# Patient Record
Sex: MALE | Race: WHITE | NOT HISPANIC OR LATINO | Employment: STUDENT | URBAN - METROPOLITAN AREA
[De-identification: names, ages, dates, MRNs, and addresses within clinical notes are randomized per-mention and may not be internally consistent; named-entity substitution may affect disease eponyms.]

---

## 2019-03-14 ENCOUNTER — OFFICE VISIT (OUTPATIENT)
Dept: INTERNAL MEDICINE CLINIC | Facility: CLINIC | Age: 19
End: 2019-03-14
Payer: COMMERCIAL

## 2019-03-14 VITALS
OXYGEN SATURATION: 95 % | WEIGHT: 151.2 LBS | HEART RATE: 130 BPM | HEIGHT: 67 IN | DIASTOLIC BLOOD PRESSURE: 100 MMHG | SYSTOLIC BLOOD PRESSURE: 134 MMHG | BODY MASS INDEX: 23.73 KG/M2 | TEMPERATURE: 98.8 F

## 2019-03-14 DIAGNOSIS — R56.9 SEIZURES (HCC): ICD-10-CM

## 2019-03-14 DIAGNOSIS — J20.9 ACUTE BRONCHITIS, UNSPECIFIED ORGANISM: Primary | ICD-10-CM

## 2019-03-14 PROCEDURE — 99204 OFFICE O/P NEW MOD 45 MIN: CPT | Performed by: INTERNAL MEDICINE

## 2019-03-14 PROCEDURE — 3008F BODY MASS INDEX DOCD: CPT | Performed by: INTERNAL MEDICINE

## 2019-03-14 PROCEDURE — 1036F TOBACCO NON-USER: CPT | Performed by: INTERNAL MEDICINE

## 2019-03-14 RX ORDER — BENZONATATE 100 MG/1
100 CAPSULE ORAL 3 TIMES DAILY PRN
Qty: 30 CAPSULE | Refills: 0 | Status: SHIPPED | OUTPATIENT
Start: 2019-03-14 | End: 2019-04-01 | Stop reason: SDUPTHER

## 2019-03-14 RX ORDER — LEVETIRACETAM 750 MG/1
TABLET, EXTENDED RELEASE ORAL
Refills: 8 | COMMUNITY
Start: 2019-03-14 | End: 2019-11-15 | Stop reason: SDUPTHER

## 2019-03-14 RX ORDER — AZITHROMYCIN 250 MG/1
TABLET, FILM COATED ORAL
Qty: 6 TABLET | Refills: 0 | Status: SHIPPED | OUTPATIENT
Start: 2019-03-14 | End: 2019-03-19

## 2019-03-14 RX ORDER — ALBUTEROL SULFATE 90 UG/1
2 AEROSOL, METERED RESPIRATORY (INHALATION) EVERY 6 HOURS PRN
Qty: 1 INHALER | Refills: 3 | Status: SHIPPED | OUTPATIENT
Start: 2019-03-14 | End: 2020-06-11 | Stop reason: SDUPTHER

## 2019-03-14 RX ORDER — ALBUTEROL SULFATE 2.5 MG/3ML
2.5 SOLUTION RESPIRATORY (INHALATION) EVERY 6 HOURS PRN
Qty: 60 VIAL | Refills: 3 | Status: SHIPPED | OUTPATIENT
Start: 2019-03-14

## 2019-03-14 NOTE — PROGRESS NOTES
INTERNAL MEDICINE OFFICE VISIT  [facility] AT [address]  Tel: [phone]      NAME: Heather Whittington  AGE: 22 y o  SEX: male  : 2000   MRN: [id number]    DATE: 3/14/2019  TIME: 5:54 PM      Assessment and Plan:  1  Acute bronchitis, unspecified organism  Patient has coughing and wheezing for the last 5 days and on examination was very tachycardic and his blood pressure was also a little elevated  He has a low-grade fever  I will get back to him with the results of the chest x-ray in order to rule out a possibility of pneumonia  He will take the antibiotic and the inhaler  He was told to go to the ER in case his condition worsens  - XR chest pa & lateral; Future  - azithromycin (ZITHROMAX) 250 mg tablet; Take 2 tablets today then 1 tablet daily x 4 days  Dispense: 6 tablet; Refill: 0  - benzonatate (TESSALON PERLES) 100 mg capsule; Take 1 capsule (100 mg total) by mouth 3 (three) times a day as needed for cough  Dispense: 30 capsule; Refill: 0  - albuterol (VENTOLIN HFA) 90 mcg/act inhaler; Inhale 2 puffs every 6 (six) hours as needed for wheezing  Dispense: 1 Inhaler; Refill: 3  - albuterol (2 5 mg/3 mL) 0 083 % nebulizer solution; Take 1 vial (2 5 mg total) by nebulization every 6 (six) hours as needed for wheezing or shortness of breath  Dispense: 60 vial; Refill: 3    2  Seizures ([address] )  He was recently diagnosed with seizures and started on Keppra  He has been seizure free since then  - Counseling Documentation: patient was counseled regarding: instructions for management, risk factor reductions, prognosis, patient and family education, impressions, risks and benefits of treatment options and importance of compliance with treatment  - Medication Side Effects: Adverse side effects of medications were reviewed with the patient/guardian today  Return for follow up visit in as needed or earlier, if needed        Chief Complaint:  Chief Complaint   Patient presents with    Cold Like Symptoms    Cough    Shortness of Breath         History of Present Illness: This is a new patient was here to establish  Bronchitis-he does not have a history of asthma in the past but for the last 5 days he has been coughing and wheezing a lot and is also short of breath  He has a very low-grade fever but does not complain of it  He has been taking over-the-counter decongestants  His heart rate and blood pressure were high which could be because of the over-the-counter medications or because of the sickness  Seizures-last year he was diagnosed with seizures when he had 2 episodes 1 after the other and was worked up for it and started on [address] by the neurologist, Randi Franco        Active Problem List:  Patient Active Problem List   Diagnosis    Seizures ([facility] )         Past Medical History:  Past Medical History:   Diagnosis Date    Seizures ([facility] )          Past Surgical History:  Past Surgical History:   Procedure Laterality Date    UNDESCENDED TESTICLE EXPLORATION           Family History:  Family History   Problem Relation Age of Onset   Mir Hypertension Mother     Asthma Father     Hypertension Maternal Grandfather     Heart disease Maternal Grandfather          Social History:  Social History     Socioeconomic History    Marital status: Single     Spouse name: None    Number of children: None    Years of education: None    Highest education level: None   Occupational History    None   Social Needs    Financial resource strain: None    Food insecurity:     Worry: None     Inability: None    Transportation needs:     Medical: None     Non-medical: None   Tobacco Use    Smoking status: Never Smoker    Smokeless tobacco: Never Used   Substance and Sexual Activity    Alcohol use: Never     Frequency: Never    Drug use: Never    Sexual activity: None   Lifestyle    Physical activity:     Days per week: None     Minutes per session: None    Stress: None   Relationships    Social connections:     Talks on phone: None     Gets together: None     Attends [employer] service: None     Active member of club or organization: None     Attends meetings of clubs or organizations: None     Relationship status: None    Intimate partner violence:     Fear of current or ex partner: None     Emotionally abused: None     Physically abused: None     Forced sexual activity: None   Other Topics Concern    None   Social History Narrative    None         Allergies:  No Known Allergies      Medications:    Current Outpatient Medications:     Levetiracetam 750 MG TB24, , Disp: , Rfl: 8    albuterol (2 5 mg/3 mL) 0 083 % nebulizer solution, Take 1 vial (2 5 mg total) by nebulization every 6 (six) hours as needed for wheezing or shortness of breath, Disp: 60 vial, Rfl: 3    albuterol (VENTOLIN HFA) 90 mcg/act inhaler, Inhale 2 puffs every 6 (six) hours as needed for wheezing, Disp: 1 Inhaler, Rfl: 3    azithromycin (ZITHROMAX) 250 mg tablet, Take 2 tablets today then 1 tablet daily x 4 days, Disp: 6 tablet, Rfl: 0    benzonatate (TESSALON PERLES) 100 mg capsule, Take 1 capsule (100 mg total) by mouth 3 (three) times a day as needed for cough, Disp: 30 capsule, Rfl: 0      The following portions of the patient's history were reviewed and updated as appropriate: past medical history, past surgical history, family history, social history, allergies, current medications and active problem list       Review of Systems:  Constitutional: Denies fever, chills, weight gain, weight loss, fatigue  Eyes: Denies eye redness, eye discharge, double vision, change in visual acuity  ENT: Denies hearing loss, tinnitus, sneezing, nasal congestion, nasal discharge, sore throat   Respiratory:  Complains of cough, expectoration,  shortness of breath, wheezing  Cardiovascular: Denies chest pain, palpitations, lower extremity swelling, orthopnea, PND  Gastrointestinal: Denies abdominal pain, heartburn, nausea, vomiting, hematemesis, diarrhea, bloody stools  Genito-Urinary: Denies dysuria, frequency, difficulty in micturition, nocturia, incontinence  Musculoskeletal: Denies back pain, joint pain, muscle pain  Neurologic: Denies confusion, lightheadedness, syncope, headache, focal weakness, sensory changes, seizures  Endocrine: Denies polyuria, polydipsia, temperature intolerance  Allergy and Immunology: Denies hives, insect bite sensitivity  Hematological and Lymphatic: Denies bleeding problems, swollen glands   Psychological: Denies depression, suicidal ideation, anxiety, panic, mood swings  Dermatological: Denies pruritus, rash, skin lesion changes      Vitals:  Vitals:    03/14/19 1404   BP: 134/100   Pulse: (!) 130   Temp: 98 8 °F (37 1 °C)   SpO2: 95%       Body mass index is 23 68 kg/m²  Weight (last 2 days)     Date/Time   Weight    03/14/19 1404   68 6 (151 2)                Physical Examination:  General: Patient is not in acute distress  Awake, alert, responding to commands  No weight gain or loss  Head: Normocephalic  Atraumatic  Eyes: Conjunctiva and lids with no swelling, erythema or discharge  Both pupils normal sized, round and reactive to light  Sclera nonicteric  ENT: External examination of nose and ear normal  Otoscopic examination shows translucent tympanic membranes with patent canals without erythema  Oropharynx moist with no erythema, edema, exudate or lesions  Neck: Supple  JVP not raised  Trachea midline  No masses  No thyromegaly  Lungs: No signs of increased work of breathing or respiratory distress  Bilateral  rhonchi  Chest wall: No tenderness  Cardiovascular: Normal PMI  No thrills  Tachycardic  S1 and S2 normal  No murmur, rub or gallop  Gastrointestinal: Abdomen soft, nontender  No guarding or rigidity  Liver and spleen not palpable  Bowel sounds present  Neurologic: Cranial nerves II-XII intact   Cortical functions normal  Motor system - Reflexes 2+ and symmetrical  Sensations normal  Musculoskeletal: Gait normal  No joint tenderness  Integumentary: Skin normal with no rash or lesions  Lymphatic: No palpable lymph nodes in neck, axilla or groin  Extremities: No clubbing, cyanosis, edema or varicosities  Psychological: Judgement and insight normal  Mood and affect normal      Laboratory Results:  CBC with diff:   No results found for: WBC, RBC, HGB, HCT, MCV, MCH, RDW, PLT    CMP:  No results found for: CREATININE, BUN, NA, K, CL, CO2, GLUCOSE, PROT, ALKPHOS, ALT, AST, BILIDIR    No results found for: HGBA1C, MG, PHOS    No results found for: TROPONINI, CKMB, CKTOTAL    Lipid Profile:   No results found for: CHOL  No results found for: HDL  No results found for: LDLCALC  No results found for: TRIG    Imaging Results:  No image results found         Health Maintenance:  Health Maintenance   Topic Date Due    INFLUENZA VACCINE  07/01/2018    Depression Screening PHQ  03/14/2020    BMI: Adult  03/14/2020    DTaP,Tdap,and Td Vaccines (7 - Td) 01/17/2021    HEPATITIS B VACCINES  Completed     Immunization History   Administered Date(s) Administered    DTaP 1999, 09/21/2000, 09/15/2004    DTaP / HiB 2000, 2000    HPV Quadrivalent 07/10/2014    HPV9 08/09/2016    Hep B, Adolescent or Pediatric 2000, 2000, 04/04/2000    Hepatitis A 01/17/2011, 07/10/2014    HiB 2000, 09/21/2000    INFLUENZA 09/15/2004, 10/13/2004, 11/16/2005, 10/17/2006, 01/17/2011, 10/03/2017    IPV 2000, 2000, 12/13/2000, 09/15/2004    MMR 06/21/2000, 09/15/2004    Meningococcal B, Recombinant 08/09/2016    Meningococcal MCV4P 01/17/2011, 08/09/2016    Tdap 01/17/2011    Varicella 06/21/2000, 07/21/2009         Ed MD Enrique  0/77/9224,7:33 PM

## 2019-03-16 ENCOUNTER — TELEPHONE (OUTPATIENT)
Dept: INTERNAL MEDICINE CLINIC | Facility: CLINIC | Age: 19
End: 2019-03-16

## 2019-03-16 NOTE — TELEPHONE ENCOUNTER
----- Message from Clemencia Roland MD sent at 3/15/2019  6:36 PM EDT -----  XR chest does not show pneumonia, please continue present treatment as advised

## 2019-04-01 ENCOUNTER — OFFICE VISIT (OUTPATIENT)
Dept: INTERNAL MEDICINE CLINIC | Facility: CLINIC | Age: 19
End: 2019-04-01
Payer: COMMERCIAL

## 2019-04-01 VITALS
WEIGHT: 150 LBS | HEART RATE: 120 BPM | BODY MASS INDEX: 23.54 KG/M2 | DIASTOLIC BLOOD PRESSURE: 84 MMHG | SYSTOLIC BLOOD PRESSURE: 126 MMHG | HEIGHT: 67 IN | OXYGEN SATURATION: 94 %

## 2019-04-01 DIAGNOSIS — J45.909 ASTHMATIC BRONCHITIS WITHOUT COMPLICATION, UNSPECIFIED ASTHMA SEVERITY, UNSPECIFIED WHETHER PERSISTENT: Primary | ICD-10-CM

## 2019-04-01 DIAGNOSIS — J20.9 ACUTE BRONCHITIS, UNSPECIFIED ORGANISM: ICD-10-CM

## 2019-04-01 PROCEDURE — 1036F TOBACCO NON-USER: CPT | Performed by: PHYSICIAN ASSISTANT

## 2019-04-01 PROCEDURE — 99214 OFFICE O/P EST MOD 30 MIN: CPT | Performed by: PHYSICIAN ASSISTANT

## 2019-04-01 PROCEDURE — 3008F BODY MASS INDEX DOCD: CPT | Performed by: PHYSICIAN ASSISTANT

## 2019-04-01 RX ORDER — BENZONATATE 100 MG/1
100 CAPSULE ORAL 3 TIMES DAILY PRN
Qty: 30 CAPSULE | Refills: 0 | Status: SHIPPED | OUTPATIENT
Start: 2019-04-01 | End: 2019-06-03

## 2019-04-01 RX ORDER — PREDNISONE 10 MG/1
10 TABLET ORAL DAILY
Qty: 20 TABLET | Refills: 0 | Status: SHIPPED | OUTPATIENT
Start: 2019-04-01 | End: 2019-06-03

## 2019-06-03 ENCOUNTER — OFFICE VISIT (OUTPATIENT)
Dept: INTERNAL MEDICINE CLINIC | Facility: CLINIC | Age: 19
End: 2019-06-03
Payer: COMMERCIAL

## 2019-06-03 VITALS
DIASTOLIC BLOOD PRESSURE: 84 MMHG | BODY MASS INDEX: 24.2 KG/M2 | WEIGHT: 154.2 LBS | HEART RATE: 114 BPM | HEIGHT: 67 IN | SYSTOLIC BLOOD PRESSURE: 124 MMHG | OXYGEN SATURATION: 96 %

## 2019-06-03 DIAGNOSIS — J45.40 ASTHMA IN ADULT, MODERATE PERSISTENT, UNCOMPLICATED: Primary | ICD-10-CM

## 2019-06-03 PROCEDURE — 3008F BODY MASS INDEX DOCD: CPT | Performed by: INTERNAL MEDICINE

## 2019-06-03 PROCEDURE — 1036F TOBACCO NON-USER: CPT | Performed by: INTERNAL MEDICINE

## 2019-06-03 PROCEDURE — 99213 OFFICE O/P EST LOW 20 MIN: CPT | Performed by: INTERNAL MEDICINE

## 2019-06-03 RX ORDER — PREDNISONE 10 MG/1
TABLET ORAL
Qty: 30 TABLET | Refills: 0 | Status: SHIPPED | OUTPATIENT
Start: 2019-06-03 | End: 2020-10-22

## 2019-06-03 RX ORDER — AZITHROMYCIN 250 MG/1
TABLET, FILM COATED ORAL
Qty: 6 TABLET | Refills: 0 | Status: SHIPPED | OUTPATIENT
Start: 2019-06-03 | End: 2019-06-08

## 2019-11-15 ENCOUNTER — TELEPHONE (OUTPATIENT)
Dept: INTERNAL MEDICINE CLINIC | Facility: CLINIC | Age: 19
End: 2019-11-15

## 2019-11-15 DIAGNOSIS — R56.9 SEIZURES (HCC): Primary | ICD-10-CM

## 2019-11-15 RX ORDER — LEVETIRACETAM 750 MG/1
750 TABLET, EXTENDED RELEASE ORAL DAILY
Qty: 90 TABLET | Refills: 1 | Status: SHIPPED | OUTPATIENT
Start: 2019-11-15 | End: 2019-11-18 | Stop reason: SDUPTHER

## 2019-11-15 NOTE — TELEPHONE ENCOUNTER
Dr Demetrio Reed is no longer in the area  Mother would like to talk to some one in regards to RX and refer to a new neurologist   RX needed  Levetiracetam 750 mg extended release      Please call pt mother Maldonado Monteiro [phone] can leave a message or call after 1:00pm

## 2019-11-18 ENCOUNTER — TELEPHONE (OUTPATIENT)
Dept: INTERNAL MEDICINE CLINIC | Facility: CLINIC | Age: 19
End: 2019-11-18

## 2019-11-18 DIAGNOSIS — R56.9 SEIZURES (HCC): ICD-10-CM

## 2019-11-18 RX ORDER — LEVETIRACETAM 750 MG/1
750 TABLET, EXTENDED RELEASE ORAL 3 TIMES DAILY
Qty: 90 TABLET | Refills: 1 | Status: SHIPPED | OUTPATIENT
Start: 2019-11-18 | End: 2019-11-20 | Stop reason: SDUPTHER

## 2019-11-18 NOTE — TELEPHONE ENCOUNTER
Florencio Erwin from [facility] called stating that Ranjana Solis called stating that Beth Silva needs   keppra (generic) 90 pills for 30 days not 90 pills for 90 days       Please send new script  To [facility]     Any questions please contact Duy Benton   [phone]

## 2019-11-20 DIAGNOSIS — R56.9 SEIZURES (HCC): ICD-10-CM

## 2019-11-20 RX ORDER — LEVETIRACETAM 750 MG/1
750 TABLET, EXTENDED RELEASE ORAL 3 TIMES DAILY
Qty: 90 TABLET | Refills: 1 | Status: SHIPPED | OUTPATIENT
Start: 2019-11-20 | End: 2020-04-21 | Stop reason: SDUPTHER

## 2019-11-20 NOTE — TELEPHONE ENCOUNTER
Jamin Nunez from [facility] called, pt's mother would like levetiracetam 750mg sent to [facility] on [address]  Medication was [facility] sent to [facility] but is cheaper at [facility]

## 2019-12-30 ENCOUNTER — TELEPHONE (OUTPATIENT)
Dept: NEUROLOGY | Facility: CLINIC | Age: 19
End: 2019-12-30

## 2019-12-30 NOTE — TELEPHONE ENCOUNTER
Best contact number for THANIA:[phone]    Emergency Contact name and number:    Referring provider: Dr Angle Burrell     Referring provider Telephone:________________    Primary Care Provider Name:     Reason for Appointment/Dx: Irina Servin     Neurology Location patient would like to be seen:    Order received? Yes                                                 Records Received? Yes     Have you ever seen another Neurologist? No    Insurance Information    Insurance Name: [employer] 2     ID/Policy #:    Secondary Insurance:    ID/Policy#: Workman's Comp/ Accident/ School  Information      Workman's Comp/Accident/School related?  No    If yes name of Insurance company:    Date of Injury:    Open Claim:no    [address] Name and Telephone Number:    Notes:                   Appointment date:04/07/2020 _____________________________

## 2020-03-04 ENCOUNTER — TELEPHONE (OUTPATIENT)
Dept: NEUROLOGY | Facility: CLINIC | Age: 20
End: 2020-03-04

## 2020-03-04 ENCOUNTER — DOCUMENTATION (OUTPATIENT)
Dept: NEUROLOGY | Facility: CLINIC | Age: 20
End: 2020-03-04

## 2020-03-04 ENCOUNTER — CONSULT (OUTPATIENT)
Dept: NEUROLOGY | Facility: CLINIC | Age: 20
End: 2020-03-04
Payer: COMMERCIAL

## 2020-03-04 VITALS
HEIGHT: 67 IN | WEIGHT: 170 LBS | SYSTOLIC BLOOD PRESSURE: 136 MMHG | DIASTOLIC BLOOD PRESSURE: 80 MMHG | BODY MASS INDEX: 26.68 KG/M2 | HEART RATE: 90 BPM

## 2020-03-04 DIAGNOSIS — R56.9 SEIZURES (HCC): ICD-10-CM

## 2020-03-04 PROCEDURE — 99244 OFF/OP CNSLTJ NEW/EST MOD 40: CPT | Performed by: PSYCHIATRY & NEUROLOGY

## 2020-03-04 NOTE — PROGRESS NOTES
Called and spoke with Jasmine from [facility] ([phone]) requesting Medical records again, let her know his mother requested it be sent a while ago  Gave her our emergency fax number [phone]

## 2020-03-04 NOTE — PROGRESS NOTES
Fabiola Anna is a 20 y o  male  Chief Complaint   Patient presents with    Seizures     Only had 3, 1st Nov 2017, 2nd Jan 2018, 3rd this morning        Assessment:  1  Seizures ([address] )          Discussion:  Patient's prior records from his neurologist Linnette Vega were reviewed, his MRI scan of the brain report from 10/18/2017 showed chronic pansinusitis otherwise normal MRI of the brain, patient was supposed to take Keppra 750 mg 2 tablets twice a day but according to the patient and the mother he was taking only 750 mg 2 tablets at nighttime, and I think that could have been 1 of the reasons for his having seizures, I have advised the patient to take 750 mg 2 tablets equal  to 1500 mg in the morning and 1500 mg in the evening and check routine blood work and a Keppra level, he was advised not to drive, he was also advised to take seizure precautions which we discussed in detail including not to climb heights, not to be left alone, not to swim alone, not to operate any machinery, avoid activities that could be obtained due to himself or to others, he was advised to avoid seizure triggers including avoiding sleep deprivation, photic lights and alcohol, an initial reporting DMV form was sent with patient and the familie's knowledge, if needed in the future we can always have him see 1 of our epilepsy specialist for seizure localization and classification,, he will follow-up with the family physician or with his dentist regarding his tongue biting I did not see any evidence of tongue bite but his mouth does feel sore, he was advised to avoid sleep deprivation and sleep at least for 7-8 hours a night, to go to the hospital if has any recurrence of the seizure and call us and to see me back in 2 months    Subjective:    HPI   Patient is here for evaluation of his seizures, he is accompanied with his mother, his 1st seizure was in 2017 September, the mother heard a scream in the morning and saw the patient shaking and stiff lasting less than a minute, he was forming at the mouth, there was no tongue biting, no incontinence, he was lethargic for 20 minutes was taken to p m  see had blood work and a CT head and was discharged the same day, a he saw a neurologist and was started on Keppra, initially according to the mom he was given 750 mg extended release 2 tablets, he then subsequently had a seizure in January of 2018 and was told to take Keppra extended release 750 mg 3 pills, and then for some reason their insurance company was not letting them get the extended release and he was changed to regular Keppra 750 mg 2 tablets twice a day a few months back but according to the mother they did not understand and he was taking Keppra 750 mg 2 tablets at nighttime, and this morning the mother heard a yell and she saw him having a seizure for about a minute with his eyes open, he bit his tongue and he was postictal for 10-15 minutes, the seizure lasted for about a minute, there was no bowel and bladder incontinence, he did not have any injuries other than the tongue biting , he lives at home, there is no family history of seizure, he had the normal childhood in no history of head trauma, no recent illnesses, he did not take any over-the-counter medications other than his asthma inhaler that he uses p r n  [facility], he usually goes to sleep by about 1:00 a m  and gets up around 8:00 a m  if he is going to school otherwise he gets up at 10 a m  no other complaints      Vitals:    03/04/20 1407   BP: 136/80   BP Location: Left arm   Patient Position: Sitting   Cuff Size: Adult   Pulse: 90   Weight: 77 1 kg (170 lb)   Height: 5' 7" (1 702 m)       Current Medications    Current Outpatient Medications:     albuterol (2 5 mg/3 mL) 0 083 % nebulizer solution, Take 1 vial (2 5 mg total) by nebulization every 6 (six) hours as needed for wheezing or shortness of breath, Disp: 60 vial, Rfl: 3    albuterol (VENTOLIN HFA) 90 mcg/act inhaler, Inhale 2 puffs every 6 (six) hours as needed for wheezing, Disp: 1 Inhaler, Rfl: 3    fluticasone-salmeterol (ADVAIR DISKUS, WIXELA INHUB) 250-50 mcg/dose inhaler, Inhale 1 puff 2 (two) times a day Rinse mouth after use , Disp: 3 Inhaler, Rfl: 3    Levetiracetam 750 MG TB24, Take 1 tablet (750 mg total) by mouth 3 (three) times a day, Disp: 90 tablet, Rfl: 1    predniSONE 10 mg tablet, Take 4 tabs daily for 3 days followed by 3 tabs daily for 3 days then 2 tabs daily for 3 days then 1 tab daily for 3 days (Patient not taking: Reported on 3/4/2020), Disp: 30 tablet, Rfl: 0      Allergies  Patient has no known allergies  Past Medical History  Past Medical History:   Diagnosis Date    Seizures [facility])          Past Surgical History:  Past Surgical History:   Procedure Laterality Date    UNDESCENDED TESTICLE EXPLORATION           Family History:  Family History   Problem Relation Age of Onset    Hypertension Mother     Asthma Father     Hypertension Maternal Grandfather     Heart disease Maternal Grandfather        Social History:   reports that he has never smoked  He has never used smokeless tobacco  He reports that he does not drink alcohol or use drugs  I have reviewed the past medical history, surgical history, social and family history, current medications, allergies vitals, review of systems, and updated this information as appropriate today  Objective:    Physical Exam    Neurological Exam    GENERAL:  Cooperative in no acute distress  Well-developed and well-nourished    HEAD and NECK   Head is atraumatic normocephalic with no lesions or masses  Neck is supple with full range of motion    CARDIOVASCULAR  Carotid Arteries-no carotid bruits  NEUROLOGIC:  Mental Status-the patient is awake alert and oriented without aphasia or apraxia  Cranial Nerves: Visual fields are full to confrontation  Discs are flat limited exam as unable to dilate the pupils    Visual acuity is 20/20 with hand-held chart Extraocular movements are full without nystagmus  Pupils are 2-1/2 mm and reactive  Face is symmetrical to light touch  Movements of facial expression move symmetrically  Hearing is normal to finger rub bilaterally  Soft palate lifts symmetrically  Shoulder shrug is symmetrical  Tongue is midline without atrophy  Motor: No drift is noted on arm extension  Strength is full in the upper and lower extremities with normal bulk and tone  Sensory: Intact to temperature and vibratory sensation in the upper and lower extremities bilaterally  Cortical function is intact  Coordination: Finger to nose testing is performed accurately  Romberg is negative  Gait reveals a normal base with symmetrical arm swing  Tandem walk is normal   Reflexes:      2+ and symmetrical Toes are downgoing  No spine tenderness, no visible injuries,          ROS:  Review of Systems   Constitutional: Negative  HENT: Negative  Eyes: Negative  Respiratory: Negative  Cardiovascular: Negative  Gastrointestinal: Negative  Endocrine: Negative  Genitourinary: Negative  Musculoskeletal: Negative  Skin: Negative  Allergic/Immunologic: Negative  Neurological: Positive for seizures  Hematological: Negative  Psychiatric/Behavioral: Negative  All other systems reviewed and are negative

## 2020-03-04 NOTE — TELEPHONE ENCOUNTER
3/4 LM FOR PT ?? IF [employer] REF NEEDED   UNABLE TO VIEW INSURANCE CARD ON [facility]     REQUESTED CALL BACK

## 2020-03-05 ENCOUNTER — TELEPHONE (OUTPATIENT)
Dept: NEUROLOGY | Facility: CLINIC | Age: 20
End: 2020-03-05

## 2020-03-10 ENCOUNTER — TELEPHONE (OUTPATIENT)
Dept: NEUROLOGY | Facility: CLINIC | Age: 20
End: 2020-03-10

## 2020-03-10 NOTE — TELEPHONE ENCOUNTER
kylie called and states that pt is there to have labs drawn  pt has a LEVE level  she states that pt took his meds about 1 hour ago  she is asking if we want labs drawn or should pt wait  i advised that pt have lab work done prior to taking medication    she will info pt

## 2020-03-16 LAB
ALBUMIN SERPL-MCNC: 4.6 G/DL (ref 3.6–5.1)
ALBUMIN/GLOB SERPL: 1.6 (CALC) (ref 1–2.5)
ALP SERPL-CCNC: 78 U/L (ref 36–130)
ALT SERPL-CCNC: 28 U/L (ref 9–46)
AST SERPL-CCNC: 21 U/L (ref 10–40)
BASOPHILS # BLD AUTO: 49 CELLS/UL (ref 0–200)
BASOPHILS NFR BLD AUTO: 0.9 %
BILIRUB SERPL-MCNC: 1.8 MG/DL (ref 0.2–1.2)
BUN SERPL-MCNC: 12 MG/DL (ref 7–25)
BUN/CREAT SERPL: ABNORMAL (CALC) (ref 6–22)
CALCIUM SERPL-MCNC: 9.8 MG/DL (ref 8.6–10.3)
CHLORIDE SERPL-SCNC: 102 MMOL/L (ref 98–110)
CO2 SERPL-SCNC: 28 MMOL/L (ref 20–32)
CREAT SERPL-MCNC: 0.94 MG/DL (ref 0.6–1.35)
EOSINOPHIL # BLD AUTO: 545 CELLS/UL (ref 15–500)
EOSINOPHIL NFR BLD AUTO: 10.1 %
ERYTHROCYTE [DISTWIDTH] IN BLOOD BY AUTOMATED COUNT: 12.1 % (ref 11–15)
GLOBULIN SER CALC-MCNC: 2.8 G/DL (CALC) (ref 1.9–3.7)
GLUCOSE SERPL-MCNC: 81 MG/DL (ref 65–99)
HCT VFR BLD AUTO: 44.1 % (ref 38.5–50)
HGB BLD-MCNC: 15.2 G/DL (ref 13.2–17.1)
LEVETIRACETAM SERPL-MCNC: 25 MCG/ML (ref 12–46)
LYMPHOCYTES # BLD AUTO: 1534 CELLS/UL (ref 850–3900)
LYMPHOCYTES NFR BLD AUTO: 28.4 %
MCH RBC QN AUTO: 30.2 PG (ref 27–33)
MCHC RBC AUTO-ENTMCNC: 34.5 G/DL (ref 32–36)
MCV RBC AUTO: 87.7 FL (ref 80–100)
MONOCYTES # BLD AUTO: 497 CELLS/UL (ref 200–950)
MONOCYTES NFR BLD AUTO: 9.2 %
NEUTROPHILS # BLD AUTO: 2776 CELLS/UL (ref 1500–7800)
NEUTROPHILS NFR BLD AUTO: 51.4 %
PLATELET # BLD AUTO: 250 THOUSAND/UL (ref 140–400)
PMV BLD REES-ECKER: 11.1 FL (ref 7.5–12.5)
POTASSIUM SERPL-SCNC: 4.2 MMOL/L (ref 3.5–5.3)
PROT SERPL-MCNC: 7.4 G/DL (ref 6.1–8.1)
RBC # BLD AUTO: 5.03 MILLION/UL (ref 4.2–5.8)
SL AMB EGFR AFRICAN AMERICAN: 135 ML/MIN/1.73M2
SL AMB EGFR NON AFRICAN AMERICAN: 116 ML/MIN/1.73M2
SODIUM SERPL-SCNC: 139 MMOL/L (ref 135–146)
WBC # BLD AUTO: 5.4 THOUSAND/UL (ref 3.8–10.8)

## 2020-03-19 ENCOUNTER — HOSPITAL ENCOUNTER (OUTPATIENT)
Dept: NEUROLOGY | Facility: HOSPITAL | Age: 20
Discharge: HOME | End: 2020-03-19
Attending: PSYCHIATRY & NEUROLOGY
Payer: COMMERCIAL

## 2020-03-19 DIAGNOSIS — R56.9 SEIZURES (HCC): ICD-10-CM

## 2020-03-19 PROCEDURE — 95816 EEG AWAKE AND DROWSY: CPT

## 2020-03-19 PROCEDURE — 95816 EEG AWAKE AND DROWSY: CPT | Performed by: PSYCHIATRY & NEUROLOGY

## 2020-04-21 DIAGNOSIS — R56.9 SEIZURES (HCC): ICD-10-CM

## 2020-04-21 RX ORDER — LEVETIRACETAM 750 MG/1
1500 TABLET, EXTENDED RELEASE ORAL 2 TIMES DAILY
Qty: 360 TABLET | Refills: 1 | Status: SHIPPED | OUTPATIENT
Start: 2020-04-21 | End: 2021-02-08 | Stop reason: CLARIF

## 2020-04-23 RX ORDER — LEVETIRACETAM 750 MG/1
TABLET ORAL
Qty: 360 TABLET | Refills: 1 | Status: SHIPPED | OUTPATIENT
Start: 2020-04-23 | End: 2021-02-09

## 2020-06-11 DIAGNOSIS — J20.9 ACUTE BRONCHITIS, UNSPECIFIED ORGANISM: ICD-10-CM

## 2020-06-11 RX ORDER — ALBUTEROL SULFATE 90 UG/1
2 AEROSOL, METERED RESPIRATORY (INHALATION) EVERY 6 HOURS PRN
Qty: 1 INHALER | Refills: 3 | Status: SHIPPED | OUTPATIENT
Start: 2020-06-11

## 2020-06-22 ENCOUNTER — TELEPHONE (OUTPATIENT)
Dept: INTERNAL MEDICINE CLINIC | Facility: CLINIC | Age: 20
End: 2020-06-22

## 2020-06-22 NOTE — TELEPHONE ENCOUNTER
Patient is requesting a refill for the    fluticasone-salmeterol (ADVAIR DISKUS, WIXELA INHUB) 250-50 mcg/dose inhaler     Send to [facility]

## 2020-07-30 ENCOUNTER — OFFICE VISIT (OUTPATIENT)
Dept: NEUROLOGY | Facility: CLINIC | Age: 20
End: 2020-07-30
Payer: COMMERCIAL

## 2020-07-30 ENCOUNTER — TELEPHONE (OUTPATIENT)
Dept: NEUROLOGY | Facility: CLINIC | Age: 20
End: 2020-07-30

## 2020-07-30 VITALS
DIASTOLIC BLOOD PRESSURE: 82 MMHG | SYSTOLIC BLOOD PRESSURE: 134 MMHG | TEMPERATURE: 99 F | BODY MASS INDEX: 24.86 KG/M2 | WEIGHT: 158.4 LBS | RESPIRATION RATE: 14 BRPM | HEART RATE: 89 BPM | HEIGHT: 67 IN | OXYGEN SATURATION: 96 %

## 2020-07-30 DIAGNOSIS — R56.9 SEIZURES (HCC): Primary | ICD-10-CM

## 2020-07-30 PROCEDURE — 99214 OFFICE O/P EST MOD 30 MIN: CPT | Performed by: PSYCHIATRY & NEUROLOGY

## 2020-07-30 PROCEDURE — 1036F TOBACCO NON-USER: CPT | Performed by: PSYCHIATRY & NEUROLOGY

## 2020-07-30 PROCEDURE — 3008F BODY MASS INDEX DOCD: CPT | Performed by: PSYCHIATRY & NEUROLOGY

## 2020-07-30 NOTE — PROGRESS NOTES
Pastora Colin is a 23 y o  male  Chief Complaint   Patient presents with    Follow-up     Last SZ: 3/4/20       Assessment:  1  Seizures ([address] )          Discussion:  Patient's routine EEG was normal, Keppra level was 25, CBC was unremarkable CMP showed a slightly elevated total bilirubin for which patient was advised to follow up with family physician,  patient and the family to call me after the test to discuss the results, he is doing good on Keppra 1500 mg twice a day, he was advised to continue with same  I have advised him to avoid seizure triggers and to take seizure precautions, to be under constant supervision, he could see 1 of her seizure specialist in the future if needed, currently they would like to hold off, he was advised to avoid sleep deprivation, to sleep at least 7-8 hours a night, to go to the hospital if has any recurrence of the seizure and call us otherwise to see me back in 3 months and follow up with the other physicians      Subjective:    HPI   Patient is here in follow-up for his seizures, he is accompanied with his mother, his 1st seizure was in 2017 September, mother heard a scream in the morning and saw the patient shaking and stiff lasting for less than a minute, he was foaming at the mouth, there was no tongue biting no incontinence he was lethargic for 20 minutes was taken to the hospital and had a blood work and CT of the head and was discharged the same day, he was started on Keppra 750 mg extended release 2 tablets a day, he subsequently had a seizure in January of 2018 and was told to take Keppra extended release 750 mg 3 pills and for some reason his insurance company did not approve the medication and it was changed to regular Keppra 750 mg 2 tablets twice a day, they did not understand the instructions and he was taking 750 mg 2 tablets once a day and then had a 3rd seizure in March at which time he saw me and we increase the medication to 750 mg 2 tablets twice a day, since then patient has not had any seizures, no side effects to the medication, mood is good, no suicidal or homicidal thoughts, no other complaints          Vitals:    07/30/20 1416   BP: 134/82   BP Location: Left arm   Patient Position: Sitting   Cuff Size: Standard   Pulse: 89   Resp: 14   Temp: 99 °F (37 2 °C)   SpO2: 96%   Weight: 71 8 kg (158 lb 6 4 oz)   Height: 5' 7" (1 702 m)       Current Medications    Current Outpatient Medications:     albuterol (2 5 mg/3 mL) 0 083 % nebulizer solution, Take 1 vial (2 5 mg total) by nebulization every 6 (six) hours as needed for wheezing or shortness of breath, Disp: 60 vial, Rfl: 3    albuterol (Ventolin HFA) 90 mcg/act inhaler, Inhale 2 puffs every 6 (six) hours as needed for wheezing, Disp: 1 Inhaler, Rfl: 3    fluticasone-salmeterol (ADVAIR DISKUS, WIXELA INHUB) 250-50 mcg/dose inhaler, Inhale 1 puff 2 (two) times a day Rinse mouth after use , Disp: 3 Inhaler, Rfl: 3    levETIRAcetam (KEPPRA) 750 mg tablet, Take 2 tablets (1,500 mg total) by mouth 2 (two) times a day, Disp: 360 tablet, Rfl: 1    Levetiracetam 750 MG TB24, Take 2 tablets (1,500 mg total) by mouth 2 (two) times a day, Disp: 360 tablet, Rfl: 1    predniSONE 10 mg tablet, Take 4 tabs daily for 3 days followed by 3 tabs daily for 3 days then 2 tabs daily for 3 days then 1 tab daily for 3 days (Patient not taking: Reported on 3/4/2020), Disp: 30 tablet, Rfl: 0      Allergies  Patient has no known allergies  Past Medical History  Past Medical History:   Diagnosis Date    Seizures [facility])          Past Surgical History:  Past Surgical History:   Procedure Laterality Date    UNDESCENDED TESTICLE EXPLORATION           Family History:  Family History   Problem Relation Age of Onset    Hypertension Mother     Asthma Father     Hypertension Maternal Grandfather     Heart disease Maternal Grandfather        Social History:   reports that he has never smoked   He has never used smokeless tobacco  He reports that he does not drink alcohol or use drugs  I have reviewed the past medical history, surgical history, social and family history, current medications, allergies vitals, review of systems, and updated this information as appropriate today  Objective:    Physical Exam    Neurological Exam    GENERAL:  Cooperative in no acute distress  Well-developed and well-nourished    HEAD and NECK   Head is atraumatic normocephalic with no lesions or masses  Neck is supple with full range of motion      NEUROLOGIC:  Mental Status-the patient is awake alert and oriented without aphasia or apraxia  Cranial Nerves: Visual fields are full to confrontation  Extraocular movements are full without nystagmus  Pupils are 2-1/2 mm and reactive  Face is symmetrical to light touch  Movements of facial expression move symmetrically  Hearing is normal to finger rub bilaterally  Soft palate lifts symmetrically  Shoulder shrug is symmetrical  Tongue is midline without atrophy  Patient did remove the mask for the exam  Motor: No drift is noted on arm extension  Strength is full in the upper and lower extremities with normal bulk and tone  Coordination: Finger to nose testing is performed accurately  Romberg is negative  Gait reveals a normal base with symmetrical arm swing  Reflexes:  2+ and symmetrical          ROS:  Review of Systems   Constitutional: Negative  Negative for appetite change and fever  HENT: Negative  Negative for hearing loss, tinnitus, trouble swallowing and voice change  Eyes: Negative  Negative for photophobia and pain  Respiratory: Negative  Negative for shortness of breath  Cardiovascular: Negative  Negative for palpitations  Gastrointestinal: Negative  Negative for nausea and vomiting  Endocrine: Negative  Negative for cold intolerance  Genitourinary: Negative  Negative for dysuria, frequency and urgency  Musculoskeletal: Negative  Negative for myalgias and neck pain     Skin: Negative  Negative for rash  Allergic/Immunologic: Negative  Neurological: Negative  Negative for dizziness, tremors, seizures, syncope, facial asymmetry, speech difficulty, weakness, light-headedness, numbness and headaches  Hematological: Negative  Does not bruise/bleed easily  Psychiatric/Behavioral: Negative  Negative for confusion, hallucinations and sleep disturbance

## 2020-07-30 NOTE — TELEPHONE ENCOUNTER
Patient's mother calling  States that she missed a call and was calling back in regards to to patient's blood work

## 2020-07-30 NOTE — TELEPHONE ENCOUNTER
Discussed with patient's mother, advised her to cancel sleep-deprived EEG, please call central scheduling and cancel that, also was advised to follow up with family physician regarding increasing total bilirubin

## 2020-07-31 NOTE — TELEPHONE ENCOUNTER
As per Dr Zenaida Jimenez Discussed with patient's mother, advised her to cancel sleep-deprived EEG, please call central scheduling and cancel that  Called central scheduling spoke with Kandi to cancel appt  ,

## 2020-10-22 ENCOUNTER — OFFICE VISIT (OUTPATIENT)
Dept: INTERNAL MEDICINE CLINIC | Facility: CLINIC | Age: 20
End: 2020-10-22
Payer: COMMERCIAL

## 2020-10-22 VITALS
SYSTOLIC BLOOD PRESSURE: 128 MMHG | BODY MASS INDEX: 25.74 KG/M2 | DIASTOLIC BLOOD PRESSURE: 60 MMHG | OXYGEN SATURATION: 98 % | WEIGHT: 164 LBS | HEIGHT: 67 IN | HEART RATE: 102 BPM | TEMPERATURE: 97.1 F

## 2020-10-22 DIAGNOSIS — Z00.00 ENCOUNTER FOR WELLNESS EXAMINATION IN ADULT: Primary | ICD-10-CM

## 2020-10-22 DIAGNOSIS — J45.40 ASTHMA IN ADULT, MODERATE PERSISTENT, UNCOMPLICATED: ICD-10-CM

## 2020-10-22 PROCEDURE — 99395 PREV VISIT EST AGE 18-39: CPT | Performed by: INTERNAL MEDICINE

## 2020-11-19 ENCOUNTER — TELEPHONE (OUTPATIENT)
Dept: NEUROLOGY | Facility: CLINIC | Age: 20
End: 2020-11-19

## 2021-02-08 ENCOUNTER — TELEMEDICINE (OUTPATIENT)
Dept: INTERNAL MEDICINE CLINIC | Facility: CLINIC | Age: 21
End: 2021-02-08
Payer: COMMERCIAL

## 2021-02-08 DIAGNOSIS — J01.01 ACUTE RECURRENT MAXILLARY SINUSITIS: Primary | ICD-10-CM

## 2021-02-08 PROCEDURE — 1036F TOBACCO NON-USER: CPT | Performed by: INTERNAL MEDICINE

## 2021-02-08 PROCEDURE — 99213 OFFICE O/P EST LOW 20 MIN: CPT | Performed by: INTERNAL MEDICINE

## 2021-02-08 RX ORDER — AMOXICILLIN 875 MG/1
875 TABLET, COATED ORAL 2 TIMES DAILY
Qty: 20 TABLET | Refills: 0 | Status: SHIPPED | OUTPATIENT
Start: 2021-02-08 | End: 2021-02-18

## 2021-02-08 NOTE — PROGRESS NOTES
Virtual Regular Visit      Assessment/Plan:    Problem List Items Addressed This Visit     None      Visit Diagnoses     Acute recurrent maxillary sinusitis    -  Primary               Reason for visit is   Chief Complaint   Patient presents with    Nasal Congestion        Encounter provider Birgit Quiros MD    Provider located at [address] [place] [address]      Recent Visits  No visits were found meeting these conditions  Showing recent visits within past 7 days and meeting all other requirements     Today's Visits  Date Type Provider Dept   02/08/21 Telemedicine Birgit Quiros MD [address] [place] [address] today's visits and meeting all other requirements     Future Appointments  No visits were found meeting these conditions  Showing future appointments within next 150 days and meeting all other requirements        The patient was identified by name and date of birth  Michele Cagle was informed that this is a telemedicine visit and that the visit is being conducted through [facility] and patient was informed that this is a secure, HIPAA-compliant platform  He agrees to proceed     My office door was closed  No one else was in the room  He acknowledged consent and understanding of privacy and security of the video platform  The patient has agreed to participate and understands they can discontinue the visit at any time  Patient is aware this is a billable service  Subjective  Michele Cagle is a 23 y o  male  Who has been complaining of nasal congestion for the last couple of weeks  He has tried taking the Allegra and the Flonase nasal spray but it is not helping his symptoms  He denies any cough, shortness of breath or fever         HPI    congestion  Past Medical History:   Diagnosis Date    Seizures [facility])        Past Surgical History:   Procedure Laterality Date    UNDESCENDED TESTICLE EXPLORATION Current Outpatient Medications   Medication Sig Dispense Refill    albuterol (2 5 mg/3 mL) 0 083 % nebulizer solution Take 1 vial (2 5 mg total) by nebulization every 6 (six) hours as needed for wheezing or shortness of breath 60 vial 3    albuterol (Ventolin HFA) 90 mcg/act inhaler Inhale 2 puffs every 6 (six) hours as needed for wheezing 1 Inhaler 3    fluticasone-salmeterol (ADVAIR, WIXELA) 250-50 mcg/dose inhaler Inhale 1 puff 2 (two) times a day Rinse mouth after use  3 Inhaler 3    levETIRAcetam (KEPPRA) 750 mg tablet Take 2 tablets (1,500 mg total) by mouth 2 (two) times a day 360 tablet 1     No current facility-administered medications for this visit  No Known Allergies    Review of Systems   Constitutional: Negative for chills, diaphoresis, fatigue and fever  HENT: Positive for congestion  Negative for ear discharge, ear pain, hearing loss, postnasal drip, rhinorrhea, sinus pressure, sinus pain, sneezing, sore throat and voice change  Eyes: Negative for pain, discharge, redness and visual disturbance  Respiratory: Negative for cough, chest tightness, shortness of breath and wheezing  Cardiovascular: Negative for chest pain, palpitations and leg swelling  Gastrointestinal: Negative for abdominal distention, abdominal pain, blood in stool, constipation, diarrhea, nausea and vomiting  Endocrine: Negative for cold intolerance, heat intolerance, polydipsia, polyphagia and polyuria  Genitourinary: Negative for dysuria, flank pain, frequency, hematuria and urgency  Musculoskeletal: Negative for arthralgias, back pain, gait problem, joint swelling, myalgias, neck pain and neck stiffness  Skin: Negative for rash  Neurological: Negative for dizziness, tremors, syncope, facial asymmetry, speech difficulty, weakness, light-headedness, numbness and headaches  Hematological: Does not bruise/bleed easily     Psychiatric/Behavioral: Negative for behavioral problems, confusion and sleep disturbance  The patient is not nervous/anxious  Video Exam    There were no vitals filed for this visit  Physical Exam  Constitutional:       Appearance: Normal appearance  HENT:      Head: Normocephalic  Eyes:      Conjunctiva/sclera: Conjunctivae normal    Pulmonary:      Effort: Pulmonary effort is normal    Neurological:      Mental Status: He is alert and oriented to person, place, and time  Psychiatric:         Mood and Affect: Mood normal        He was advised to take amoxicillin for 10 days along with the   Flonase and Allegra    I spent  20 minutes with patient today in which greater than 50% of the time was spent in counseling/coordination of care regarding  management of sinusitis      VIRTUAL VISIT DISCLAIMER    Fabiola Anna acknowledges that he has consented to an online visit or consultation  He understands that the online visit is based solely on information provided by him, and that, in the absence of a face-to-face physical evaluation by the physician, the diagnosis he receives is both limited and provisional in terms of accuracy and completeness  This is not intended to replace a full medical face-to-face evaluation by the physician  Fabiola Anna understands and accepts these terms

## 2021-02-09 DIAGNOSIS — R56.9 SEIZURES (HCC): ICD-10-CM

## 2021-02-09 RX ORDER — LEVETIRACETAM 750 MG/1
TABLET ORAL
Qty: 360 TABLET | Refills: 1 | Status: SHIPPED | OUTPATIENT
Start: 2021-02-09 | End: 2021-08-04 | Stop reason: SDUPTHER

## 2021-03-09 ENCOUNTER — TELEPHONE (OUTPATIENT)
Dept: INTERNAL MEDICINE CLINIC | Facility: CLINIC | Age: 21
End: 2021-03-09

## 2021-03-09 NOTE — TELEPHONE ENCOUNTER
PT  SAID  HE  GOT  A  MESSAGE    FROM  OUR  OFFICE  TODAY   BUT  I  CAN'T  SEE  WHERE   ANYONE  CALLED  HIM  CALL PT  [phone]

## 2021-04-22 ENCOUNTER — TELEMEDICINE (OUTPATIENT)
Dept: INTERNAL MEDICINE CLINIC | Facility: CLINIC | Age: 21
End: 2021-04-22
Payer: COMMERCIAL

## 2021-04-22 DIAGNOSIS — J01.11 ACUTE RECURRENT FRONTAL SINUSITIS: Primary | ICD-10-CM

## 2021-04-22 PROCEDURE — 99213 OFFICE O/P EST LOW 20 MIN: CPT | Performed by: INTERNAL MEDICINE

## 2021-04-22 RX ORDER — AMOXICILLIN 875 MG/1
875 TABLET, COATED ORAL 2 TIMES DAILY
Qty: 14 TABLET | Refills: 0 | Status: SHIPPED | OUTPATIENT
Start: 2021-04-22 | End: 2021-04-29

## 2021-04-22 NOTE — PROGRESS NOTES
Virtual Regular Visit      Assessment/Plan:    Problem List Items Addressed This Visit     None      Visit Diagnoses     Acute recurrent frontal sinusitis    -  Primary    Relevant Medications    amoxicillin (AMOXIL) 875 mg tablet               Reason for visit is No chief complaint on file  Encounter provider Farzaneh Lindo MD    Provider located at [address] [place] [address]      Recent Visits  No visits were found meeting these conditions  Showing recent visits within past 7 days and meeting all other requirements     Future Appointments  No visits were found meeting these conditions  Showing future appointments within next 150 days and meeting all other requirements        The patient was identified by name and date of birth  Carlie Chang was informed that this is a telemedicine visit and that the visit is being conducted through [employer] and patient was informed that this is not a secure, HIPAA-compliant platform  He agrees to proceed     My office door was closed  No one else was in the room  He acknowledged consent and understanding of privacy and security of the video platform  The patient has agreed to participate and understands they can discontinue the visit at any time  Patient is aware this is a billable service  Subjective  Carlie Chang is a 23 y o  male  Has been having nasal congestion and frontal headache for the last 1 week despite taking Allegra and Flonase nasal spray  His symptoms are not improving despite taking the above medications  He denies any fever or cough  Denies any shortness of breath         HPI    sinusitis     Past Medical History:   Diagnosis Date    Seizures ([address] )        Past Surgical History:   Procedure Laterality Date    UNDESCENDED TESTICLE EXPLORATION         Current Outpatient Medications   Medication Sig Dispense Refill    albuterol (2 5 mg/3 mL) 0 083 % nebulizer solution Take 1 vial (2 5 mg total) by nebulization every 6 (six) hours as needed for wheezing or shortness of breath 60 vial 3    albuterol (Ventolin HFA) 90 mcg/act inhaler Inhale 2 puffs every 6 (six) hours as needed for wheezing 1 Inhaler 3    amoxicillin (AMOXIL) 875 mg tablet Take 1 tablet (875 mg total) by mouth 2 (two) times a day for 7 days 14 tablet 0    fluticasone-salmeterol (ADVAIR, WIXELA) 250-50 mcg/dose inhaler Inhale 1 puff 2 (two) times a day Rinse mouth after use  3 Inhaler 3    levETIRAcetam (KEPPRA) 750 mg tablet TAKE 2 TABLETS (1,500 MG TOTAL) BY MOUTH 2 (TWO) TIMES A  tablet 1     No current facility-administered medications for this visit  No Known Allergies    Review of Systems   Constitutional: Negative for chills, diaphoresis, fatigue and fever  HENT: Positive for congestion, rhinorrhea and sinus pressure  Negative for ear discharge, ear pain, hearing loss, postnasal drip, sinus pain, sneezing, sore throat and voice change  Eyes: Negative for pain, discharge, redness and visual disturbance  Respiratory: Negative for cough, chest tightness, shortness of breath and wheezing  Cardiovascular: Negative for chest pain, palpitations and leg swelling  Gastrointestinal: Negative for abdominal distention, abdominal pain, blood in stool, constipation, diarrhea, nausea and vomiting  Endocrine: Negative for cold intolerance, heat intolerance, polydipsia, polyphagia and polyuria  Genitourinary: Negative for dysuria, flank pain, frequency, hematuria and urgency  Musculoskeletal: Negative for arthralgias, back pain, gait problem, joint swelling, myalgias, neck pain and neck stiffness  Skin: Negative for rash  Neurological: Negative for dizziness, tremors, syncope, facial asymmetry, speech difficulty, weakness, light-headedness, numbness and headaches  Hematological: Does not bruise/bleed easily     Psychiatric/Behavioral: Negative for behavioral problems, confusion and sleep disturbance  The patient is not nervous/anxious  Video Exam    There were no vitals filed for this visit  Physical Exam  Constitutional:       Appearance: Normal appearance  HENT:      Head: Normocephalic  Eyes:      Conjunctiva/sclera: Conjunctivae normal    Pulmonary:      Effort: Pulmonary effort is normal    Neurological:      Mental Status: He is alert and oriented to person, place, and time  Psychiatric:         Mood and Affect: Mood normal       He was advised to continue taking the Allegra and Flonase nasal spray and add the amoxicillin for 7 days  Was also advised to take steam inhalation      I spent 20 minutes with patient today in which greater than 50% of the time was spent in counseling/coordination of care regarding  management of sinusitis      VIRTUAL VISIT DISCLAIMER    Ryan Hernandez acknowledges that he has consented to an online visit or consultation  He understands that the online visit is based solely on information provided by him, and that, in the absence of a face-to-face physical evaluation by the physician, the diagnosis he receives is both limited and provisional in terms of accuracy and completeness  This is not intended to replace a full medical face-to-face evaluation by the physician  Ryan Hernandez understands and accepts these terms

## 2021-08-04 DIAGNOSIS — R56.9 SEIZURES (HCC): ICD-10-CM

## 2021-08-04 RX ORDER — LEVETIRACETAM 750 MG/1
1500 TABLET ORAL 2 TIMES DAILY
Qty: 360 TABLET | Refills: 0 | Status: SHIPPED | OUTPATIENT
Start: 2021-08-04 | End: 2021-09-20 | Stop reason: SDUPTHER

## 2021-09-20 DIAGNOSIS — R56.9 SEIZURES (HCC): ICD-10-CM

## 2021-09-21 RX ORDER — LEVETIRACETAM 750 MG/1
1500 TABLET ORAL 2 TIMES DAILY
Qty: 360 TABLET | Refills: 1 | Status: SHIPPED | OUTPATIENT
Start: 2021-09-21 | End: 2022-02-24

## 2022-01-01 DIAGNOSIS — J45.40 ASTHMA IN ADULT, MODERATE PERSISTENT, UNCOMPLICATED: ICD-10-CM

## 2022-02-24 DIAGNOSIS — R56.9 SEIZURES (HCC): ICD-10-CM

## 2022-02-24 RX ORDER — LEVETIRACETAM 750 MG/1
TABLET ORAL
Qty: 360 TABLET | Refills: 1 | Status: SHIPPED | OUTPATIENT
Start: 2022-02-24

## 2022-05-05 DIAGNOSIS — J45.40 ASTHMA IN ADULT, MODERATE PERSISTENT, UNCOMPLICATED: ICD-10-CM

## 2022-05-05 RX ORDER — FLUTICASONE PROPIONATE AND SALMETEROL 250; 50 UG/1; UG/1
1 POWDER RESPIRATORY (INHALATION) 2 TIMES DAILY
Qty: 60 BLISTER | Refills: 3 | Status: SHIPPED | OUTPATIENT
Start: 2022-05-05

## 2022-05-05 NOTE — TELEPHONE ENCOUNTER
Pt needs refill for  Advair Diskus 250-50 MCG/DOSE inhaler      Please indicate on script, "Brand Name Only"  The insurance company will not cover the generic

## 2022-09-02 DIAGNOSIS — R56.9 SEIZURES (HCC): ICD-10-CM

## 2022-09-06 RX ORDER — LEVETIRACETAM 750 MG/1
TABLET ORAL
Qty: 360 TABLET | Refills: 1 | Status: SHIPPED | OUTPATIENT
Start: 2022-09-06

## 2022-10-03 ENCOUNTER — OFFICE VISIT (OUTPATIENT)
Dept: INTERNAL MEDICINE CLINIC | Facility: CLINIC | Age: 22
End: 2022-10-03
Payer: COMMERCIAL

## 2022-10-03 VITALS
HEIGHT: 68 IN | OXYGEN SATURATION: 98 % | SYSTOLIC BLOOD PRESSURE: 120 MMHG | BODY MASS INDEX: 26.22 KG/M2 | RESPIRATION RATE: 20 BRPM | TEMPERATURE: 97.4 F | WEIGHT: 173 LBS | HEART RATE: 138 BPM | DIASTOLIC BLOOD PRESSURE: 82 MMHG

## 2022-10-03 DIAGNOSIS — R56.9 SEIZURES (HCC): ICD-10-CM

## 2022-10-03 DIAGNOSIS — J20.9 ACUTE BRONCHITIS, UNSPECIFIED ORGANISM: ICD-10-CM

## 2022-10-03 PROCEDURE — 99213 OFFICE O/P EST LOW 20 MIN: CPT | Performed by: INTERNAL MEDICINE

## 2022-10-03 RX ORDER — AZITHROMYCIN 250 MG/1
TABLET, FILM COATED ORAL
Qty: 6 TABLET | Refills: 0 | Status: SHIPPED | OUTPATIENT
Start: 2022-10-03 | End: 2022-10-08

## 2022-10-03 RX ORDER — ALBUTEROL SULFATE 2.5 MG/3ML
2.5 SOLUTION RESPIRATORY (INHALATION) EVERY 6 HOURS PRN
Qty: 270 ML | Refills: 3 | Status: SHIPPED | OUTPATIENT
Start: 2022-10-03

## 2022-10-03 NOTE — PROGRESS NOTES
INTERNAL MEDICINE FOLLOW-UP OFFICE VISIT  [facility]HAVIOR[facility]IC[facility] AT [facility]    NAME: Tobias Nicholas  AGE: 20 y o  SEX: male  : 2000   MRN: [id number]    DATE: 10/3/2022  TIME: 2:36 PM    Assessment and Plan     Diagnoses and all orders for this visit:    Acute bronchitis, unspecified organism  -     albuterol (2 5 mg/3 mL) 0 083 % nebulizer solution; Take 3 mL (2 5 mg total) by nebulization every 6 (six) hours as needed for wheezing or shortness of breath  -     azithromycin (ZITHROMAX) 250 mg tablet; Take 2 tablets today then 1 tablet daily x 4 days   he was advised to take the Advair twice a day regularly and to take the albuterol/ nebulizer 2 to 3 times a day as needed  [facility]        - Counseling Documentation: patient was counseled regarding: instructions for management, risk factor reductions, prognosis, patient and family education, risks and benefits of treatment options and importance of compliance with treatment  - Medication Side Effects: Adverse side effects of medications were reviewed with the patient/guardian today  Return to office in: as needed    Chief Complaint     Chief Complaint   Patient presents with    Nasal Congestion     Since last Wednesday, Sore throat at first but went away  Congestion, cough no fever  Green/dark yellow phlegm  Pt I tested for COVID at home and is negative  History of Present Illness     Cough  This is a recurrent problem  The current episode started in the past 7 days  The problem has been unchanged  The problem occurs every few minutes  The cough is productive of purulent sputum  Associated symptoms include nasal congestion, rhinorrhea, a sore throat, shortness of breath and wheezing  Pertinent negatives include no chest pain, chills, ear pain, eye redness, fever, headaches, myalgias, postnasal drip or rash  Nothing aggravates the symptoms   He has tried a beta-agonist inhaler, OTC cough suppressant and steroid inhaler for the symptoms  The treatment provided mild relief  His past medical history is significant for asthma  The following portions of the patient's history were reviewed and updated as appropriate: allergies, current medications, past family history, past medical history, past social history, past surgical history and problem list     Review of Systems     Review of Systems   Constitutional: Negative for chills, diaphoresis, fatigue and fever  HENT: Positive for congestion, rhinorrhea and sore throat  Negative for ear discharge, ear pain, hearing loss, postnasal drip, sinus pressure, sinus pain, sneezing and voice change  Eyes: Negative for pain, discharge, redness and visual disturbance  Respiratory: Positive for cough, shortness of breath and wheezing  Negative for chest tightness  Cardiovascular: Negative for chest pain, palpitations and leg swelling  Gastrointestinal: Negative for abdominal distention, abdominal pain, blood in stool, constipation, diarrhea, nausea and vomiting  Endocrine: Negative for cold intolerance, heat intolerance, polydipsia, polyphagia and polyuria  Genitourinary: Negative for dysuria, flank pain, frequency, hematuria and urgency  Musculoskeletal: Negative for arthralgias, back pain, gait problem, joint swelling, myalgias, neck pain and neck stiffness  Skin: Negative for rash  Neurological: Negative for dizziness, tremors, syncope, facial asymmetry, speech difficulty, weakness, light-headedness, numbness and headaches  Hematological: Does not bruise/bleed easily  Psychiatric/Behavioral: Negative for behavioral problems, confusion and sleep disturbance  The patient is not nervous/anxious          Active Problem List     Patient Active Problem List   Diagnosis    Seizures ([facility] )    Asthma in adult, moderate persistent, uncomplicated       Objective     /82 (BP Location: Left arm, Patient Position: Sitting, Cuff Size: Standard)   Pulse (!) 138   Temp (!) 97 4 °F (36 3 °C) (Tympanic)   Resp 20   Ht 5' 8" (1 727 m)   Wt 78 5 kg (173 lb)   SpO2 98%   BMI 26 30 kg/m²     Physical Exam  Constitutional:       General: He is not in acute distress  Appearance: He is well-developed  He is not diaphoretic  HENT:      Head: Normocephalic and atraumatic  Right Ear: External ear normal       Left Ear: External ear normal       Nose: Nose normal       Mouth/Throat:      Pharynx: Posterior oropharyngeal erythema present  Eyes:      General: No scleral icterus  Right eye: No discharge  Left eye: No discharge  Conjunctiva/sclera: Conjunctivae normal    Neck:      Thyroid: No thyromegaly  Vascular: No JVD  Trachea: No tracheal deviation  Cardiovascular:      Rate and Rhythm: Normal rate and regular rhythm  Heart sounds: Normal heart sounds  No murmur heard  No friction rub  No gallop  Pulmonary:      Effort: Pulmonary effort is normal  No respiratory distress  Breath sounds: Wheezing present  No rales  Chest:      Chest wall: No tenderness  Abdominal:      General: Bowel sounds are normal  There is no distension  Palpations: Abdomen is soft  Tenderness: There is no abdominal tenderness  There is no guarding or rebound  Musculoskeletal:         General: No tenderness  Normal range of motion  Cervical back: Normal range of motion and neck supple  Lymphadenopathy:      Cervical: No cervical adenopathy  Skin:     General: Skin is warm and dry  Findings: No erythema or rash  Neurological:      Mental Status: He is alert and oriented to person, place, and time  Cranial Nerves: No cranial nerve deficit  Motor: No abnormal muscle tone        Coordination: Coordination normal    Psychiatric:         Judgment: Judgment normal              Current Medications       Current Outpatient Medications:     albuterol (2 5 mg/3 mL) 0 083 % nebulizer solution, Take 3 mL (2 5 mg total) by nebulization every 6 (six) hours as needed for wheezing or shortness of breath, Disp: 270 mL, Rfl: 3    albuterol (Ventolin HFA) 90 mcg/act inhaler, Inhale 2 puffs every 6 (six) hours as needed for wheezing, Disp: 1 Inhaler, Rfl: 3    azithromycin (ZITHROMAX) 250 mg tablet, Take 2 tablets today then 1 tablet daily x 4 days, Disp: 6 tablet, Rfl: 0    Fluticasone-Salmeterol (Advair Diskus) 250-50 mcg/dose inhaler, Inhale 1 puff 2 (two) times a day Rinse mouth after use, Disp: 60 blister, Rfl: 3    levETIRAcetam (KEPPRA) 750 mg tablet, TAKE 2 TABLETS BY MOUTH TWICE A DAY, Disp: 360 tablet, Rfl: 1    Health Maintenance     Health Maintenance   Topic Date Due    Hepatitis C Screening  Never done    Pneumococcal Vaccine: Pediatrics (0 to 5 Years) and At-Risk Patients (6 to 59 Years) (1 - PCV) Never done    HIV Screening  Never done    HPV Vaccine (3 - Male 3-dose series) 12/09/2016    BMI: Adult  Never done    DTaP,Tdap,and Td Vaccines (7 - Td or Tdap) 01/17/2021    Annual Physical  10/22/2021    Influenza Vaccine (1) 09/01/2022    Depression Screening  10/03/2023    HIB Vaccine  Completed    Hepatitis B Vaccine  Completed    IPV Vaccine  Completed    Hepatitis A Vaccine  Completed    Meningococcal ACWY Vaccine  Completed    COVID-19 Vaccine  Completed     Immunization History   Administered Date(s) Administered    COVID-19 PFIZER VACCINE 0 3 ML IM 04/10/2021, 05/02/2021, 11/09/2021    DTaP 2000, 09/21/2000, 09/15/2004    DTaP / HiB 2000, 2000    HPV Quadrivalent 07/10/2014    HPV9 08/09/2016    Hep B, Adolescent or Pediatric 2000, 2000, 04/04/2000    Hepatitis A 01/17/2011, 07/10/2014    HiB 2000, 09/21/2000    INFLUENZA 09/15/2004, 10/13/2004, 11/16/2005, 10/17/2006, 01/17/2011, 10/03/2017    IPV 2000, 2000, 12/13/2000, 09/15/2004    MMR 06/21/2000, 09/15/2004    Meningococcal B, Recombinant (Josefa Jacinto) 08/09/2016    Meningococcal MCV4P 01/17/2011, 08/09/2016  Tdap 01/17/2011    Varicella 06/21/2000, 07/21/2009         [facility] [address] [facility] BEHAVIORAL MEDICINE AT [facility]

## 2022-10-19 DIAGNOSIS — J45.40 ASTHMA IN ADULT, MODERATE PERSISTENT, UNCOMPLICATED: Primary | ICD-10-CM

## 2022-10-20 ENCOUNTER — TELEPHONE (OUTPATIENT)
Dept: PULMONOLOGY | Facility: CLINIC | Age: 22
End: 2022-10-20

## 2022-10-20 NOTE — TELEPHONE ENCOUNTER
Left message for pt to call office to schedule consult appt for asthma as an adult from referral for es

## 2022-11-30 DIAGNOSIS — J20.9 ACUTE BRONCHITIS, UNSPECIFIED ORGANISM: ICD-10-CM

## 2022-11-30 RX ORDER — ALBUTEROL SULFATE 90 UG/1
AEROSOL, METERED RESPIRATORY (INHALATION)
Qty: 8.5 G | Refills: 3 | Status: SHIPPED | OUTPATIENT
Start: 2022-11-30

## 2023-02-09 DIAGNOSIS — R56.9 SEIZURES (HCC): ICD-10-CM

## 2023-02-10 RX ORDER — LEVETIRACETAM 750 MG/1
TABLET ORAL
Qty: 360 TABLET | Refills: 1 | Status: SHIPPED | OUTPATIENT
Start: 2023-02-10

## 2023-05-28 DIAGNOSIS — J45.40 ASTHMA IN ADULT, MODERATE PERSISTENT, UNCOMPLICATED: ICD-10-CM

## 2023-05-28 RX ORDER — FLUTICASONE PROPIONATE AND SALMETEROL 50; 250 UG/1; UG/1
POWDER RESPIRATORY (INHALATION)
Qty: 60 BLISTER | Refills: 3 | Status: SHIPPED | OUTPATIENT
Start: 2023-05-28

## 2023-09-09 DIAGNOSIS — R56.9 SEIZURES (HCC): ICD-10-CM

## 2023-09-11 RX ORDER — LEVETIRACETAM 750 MG/1
TABLET ORAL
Qty: 360 TABLET | Refills: 1 | Status: SHIPPED | OUTPATIENT
Start: 2023-09-11

## 2023-12-10 DIAGNOSIS — J20.9 ACUTE BRONCHITIS, UNSPECIFIED ORGANISM: ICD-10-CM

## 2023-12-11 RX ORDER — ALBUTEROL SULFATE 90 UG/1
AEROSOL, METERED RESPIRATORY (INHALATION)
Qty: 8.5 G | Refills: 3 | Status: SHIPPED | OUTPATIENT
Start: 2023-12-11

## 2024-02-27 DIAGNOSIS — R56.9 SEIZURES (HCC): ICD-10-CM

## 2024-02-27 RX ORDER — LEVETIRACETAM 750 MG/1
TABLET ORAL
Qty: 360 TABLET | Refills: 1 | Status: SHIPPED | OUTPATIENT
Start: 2024-02-27

## 2024-08-22 ENCOUNTER — OFFICE VISIT (OUTPATIENT)
Dept: URGENT CARE | Facility: CLINIC | Age: 24
End: 2024-08-22
Payer: COMMERCIAL

## 2024-08-22 VITALS
BODY MASS INDEX: 22.73 KG/M2 | HEART RATE: 97 BPM | SYSTOLIC BLOOD PRESSURE: 140 MMHG | OXYGEN SATURATION: 98 % | DIASTOLIC BLOOD PRESSURE: 73 MMHG | TEMPERATURE: 98.6 F | WEIGHT: 150 LBS | HEIGHT: 68 IN | RESPIRATION RATE: 15 BRPM

## 2024-08-22 DIAGNOSIS — R20.2 NUMBNESS AND TINGLING IN LEFT HAND: Primary | ICD-10-CM

## 2024-08-22 DIAGNOSIS — R20.0 NUMBNESS AND TINGLING IN LEFT HAND: Primary | ICD-10-CM

## 2024-08-22 PROCEDURE — G0382 LEV 3 HOSP TYPE B ED VISIT: HCPCS | Performed by: FAMILY MEDICINE

## 2024-08-22 RX ORDER — NAPROXEN 500 MG/1
500 TABLET ORAL 2 TIMES DAILY WITH MEALS
Qty: 20 TABLET | Refills: 0 | Status: SHIPPED | OUTPATIENT
Start: 2024-08-22

## 2024-08-22 RX ORDER — CYCLOBENZAPRINE HCL 10 MG
10 TABLET ORAL 3 TIMES DAILY PRN
Qty: 30 TABLET | Refills: 0 | Status: SHIPPED | OUTPATIENT
Start: 2024-08-22

## 2024-08-22 NOTE — PROGRESS NOTES
[facility]        NAME: Maksim Campuzano is a 24 y.o. male  : 2000    MRN: [id number]  DATE: 2024  TIME: 3:13 PM    Assessment and Plan   Numbness and tingling in left hand [R20.0, R20.2]  1. Numbness and tingling in left hand  naproxen (Naprosyn) 500 mg tablet    cyclobenzaprine (FLEXERIL) 10 mg tablet    likelly from cervical spine based on my exam but may need further testing (EMG) if not improving. given NSAIDs/Muscle relaxer          Needs follow up with PCP to see if further testing is needed. He has an appt scheduled for october  Patient Instructions       Follow up with PCP in 3-5 days.  Proceed to  ER if symptoms worsen.    If tests have been performed at [facility], our office will contact you with results if changes need to be made to the care plan discussed with you at the visit.  You can review your full results on [facility] MyChart.    Chief Complaint     Chief Complaint   Patient presents with   • Numbness     Pt reports that he is having tingling in his left hand that started a few day to a week ago- he reports that it happens at other places in his body but today its in his left hand.          History of Present Illness       Patient complains of left hand tingling that has been present for about a week. He states that the tingling is in his entire hand to all fingers. He denies loss of strength. No fevers/chills/n/v  Denies numbness          Review of Systems   Review of Systems   Constitutional:  Negative for activity change, chills, fatigue and fever.   Gastrointestinal:  Negative for diarrhea, nausea and vomiting.   Neurological:  Negative for dizziness, tremors, seizures, syncope, facial asymmetry, speech difficulty, weakness, light-headedness, numbness and headaches.        History of seizures but none recently         Current Medications       Current Outpatient Medications:   •  Advair Diskus 250-50 MCG/ACT inhaler, INHALE 1 PUFF 2 TIMES A DAY RINSE MOUTH AFTER  "USE., Disp: 60 blister, Rfl: 3  •  albuterol (2.5 mg/3 mL) 0.083 % nebulizer solution, Take 3 mL (2.5 mg total) by nebulization every 6 (six) hours as needed for wheezing or shortness of breath, Disp: 270 mL, Rfl: 3  •  albuterol (PROVENTIL HFA,VENTOLIN HFA) 90 mcg/act inhaler, INHALE 2 PUFFS BY MOUTH EVERY 6 HOURS AS NEEDED FOR WHEEZE, Disp: 8.5 g, Rfl: 3  •  cyclobenzaprine (FLEXERIL) 10 mg tablet, Take 1 tablet (10 mg total) by mouth 3 (three) times a day as needed for muscle spasms, Disp: 30 tablet, Rfl: 0  •  levETIRAcetam (KEPPRA) 750 mg tablet, TAKE 2 TABLETS BY MOUTH TWICE A DAY, Disp: 360 tablet, Rfl: 1  •  naproxen (Naprosyn) 500 mg tablet, Take 1 tablet (500 mg total) by mouth 2 (two) times a day with meals, Disp: 20 tablet, Rfl: 0    Current Allergies     Allergies as of 08/22/2024   • (No Known Allergies)            The following portions of the patient's history were reviewed and updated as appropriate: allergies, current medications, past family history, past medical history, past social history, past surgical history and problem list.     Past Medical History:   Diagnosis Date   • Seizures (HCC)        Past Surgical History:   Procedure Laterality Date   • UNDESCENDED TESTICLE EXPLORATION         Family History   Problem Relation Age of Onset   • Hypertension Mother    • Asthma Father    • Hypertension Maternal Grandfather    • Heart disease Maternal Grandfather          Medications have been verified.        Objective   /73   Pulse 97   Temp 98.6 °F (37 °C)   Resp 15   Ht 5' 8\" (1.727 m)   Wt 68 kg (150 lb)   SpO2 98%   BMI 22.81 kg/m²   No LMP for male patient.       Physical Exam     Physical Exam  Vitals reviewed.   Constitutional:       Appearance: Normal appearance.   HENT:      Head: Normocephalic and atraumatic.   Cardiovascular:      Rate and Rhythm: Normal rate and regular rhythm.      Heart sounds: No murmur heard.  Pulmonary:      Effort: Pulmonary effort is normal. No " respiratory distress.      Breath sounds: No stridor. No wheezing, rhonchi or rales.   Chest:      Chest wall: No tenderness.   Musculoskeletal:         General: No swelling, tenderness, deformity or signs of injury.      Right lower leg: No edema.      Left lower leg: No edema.      Comments: +spurlings on the left. No pain at wrist or elbow. Neg tinel/phalens   Neurological:      Mental Status: He is alert.

## 2024-08-22 NOTE — LETTER
August 22, 2024     Patient: Maksim Campuzano   YOB: 2000   Date of Visit: 8/22/2024       To Whom It May Concern:    Kris Campuzano  was seen and treated at [facility] on 8/22/24    If you have any questions or concerns, please don't hesitate to call.         Sincerely,        Nelsy Garcia,     CC: No Recipients

## 2024-09-09 DIAGNOSIS — R56.9 SEIZURES (HCC): ICD-10-CM

## 2024-09-09 RX ORDER — LEVETIRACETAM 750 MG/1
TABLET ORAL
Qty: 360 TABLET | Refills: 1 | Status: SHIPPED | OUTPATIENT
Start: 2024-09-09

## 2025-03-07 DIAGNOSIS — J20.9 ACUTE BRONCHITIS, UNSPECIFIED ORGANISM: ICD-10-CM

## 2025-03-09 ENCOUNTER — TELEPHONE (OUTPATIENT)
Dept: NEUROLOGY | Facility: CLINIC | Age: 25
End: 2025-03-09

## 2025-03-09 DIAGNOSIS — R56.9 SEIZURES (HCC): ICD-10-CM

## 2025-03-11 RX ORDER — LEVETIRACETAM 750 MG/1
1500 TABLET ORAL 2 TIMES DAILY
Qty: 360 TABLET | Refills: 1 | Status: SHIPPED | OUTPATIENT
Start: 2025-03-11

## 2025-03-12 NOTE — TELEPHONE ENCOUNTER
Left a detailed message for the pt to schedule an earlier appt with his new provider if he needs his inhaler. He has not seen Dr. Kennedy since 2023.

## 2025-03-17 RX ORDER — ALBUTEROL SULFATE 90 UG/1
2 INHALANT RESPIRATORY (INHALATION) EVERY 6 HOURS PRN
Refills: 3 | OUTPATIENT
Start: 2025-03-17

## 2025-04-03 RX ORDER — LORATADINE 10 MG/1
10 TABLET ORAL DAILY
COMMUNITY
End: 2025-04-07 | Stop reason: SDUPTHER

## 2025-04-07 ENCOUNTER — OFFICE VISIT (OUTPATIENT)
Dept: FAMILY MEDICINE CLINIC | Facility: CLINIC | Age: 25
End: 2025-04-07
Payer: COMMERCIAL

## 2025-04-07 VITALS
WEIGHT: 152 LBS | HEIGHT: 68 IN | SYSTOLIC BLOOD PRESSURE: 125 MMHG | DIASTOLIC BLOOD PRESSURE: 78 MMHG | BODY MASS INDEX: 23.04 KG/M2 | OXYGEN SATURATION: 97 % | HEART RATE: 93 BPM

## 2025-04-07 DIAGNOSIS — Z13.1 SCREENING FOR DIABETES MELLITUS: ICD-10-CM

## 2025-04-07 DIAGNOSIS — Z00.00 ANNUAL PHYSICAL EXAM: Primary | ICD-10-CM

## 2025-04-07 DIAGNOSIS — Z13.220 SCREENING FOR HYPERLIPIDEMIA: ICD-10-CM

## 2025-04-07 DIAGNOSIS — J33.9 NASAL POLYPS: ICD-10-CM

## 2025-04-07 DIAGNOSIS — Z13.0 SCREENING FOR DEFICIENCY ANEMIA: ICD-10-CM

## 2025-04-07 DIAGNOSIS — J45.20 MILD INTERMITTENT ASTHMA WITHOUT COMPLICATION: ICD-10-CM

## 2025-04-07 DIAGNOSIS — R56.9 SEIZURES (HCC): ICD-10-CM

## 2025-04-07 DIAGNOSIS — J45.40 ASTHMA IN ADULT, MODERATE PERSISTENT, UNCOMPLICATED: ICD-10-CM

## 2025-04-07 PROCEDURE — 99385 PREV VISIT NEW AGE 18-39: CPT

## 2025-04-07 PROCEDURE — 99204 OFFICE O/P NEW MOD 45 MIN: CPT

## 2025-04-07 RX ORDER — AMOXICILLIN 500 MG/1
500 TABLET, FILM COATED ORAL 3 TIMES DAILY
COMMUNITY
Start: 2025-03-14

## 2025-04-07 RX ORDER — FLUTICASONE PROPIONATE AND SALMETEROL 250; 50 UG/1; UG/1
1 POWDER RESPIRATORY (INHALATION) DAILY
COMMUNITY
End: 2025-04-07 | Stop reason: ALTCHOICE

## 2025-04-07 RX ORDER — LORATADINE 10 MG/1
10 TABLET ORAL DAILY
Qty: 90 TABLET | Refills: 0 | Status: SHIPPED | OUTPATIENT
Start: 2025-04-07 | End: 2025-07-06

## 2025-04-07 RX ORDER — ALBUTEROL SULFATE 90 UG/1
2 INHALANT RESPIRATORY (INHALATION) EVERY 6 HOURS PRN
Qty: 8.5 G | Refills: 3 | Status: SHIPPED | OUTPATIENT
Start: 2025-04-07

## 2025-04-07 NOTE — PATIENT INSTRUCTIONS
"Patient Education     Routine physical for adults   The Basics   Written by the doctors and editors at [facility]   What is a physical? -- A physical is a routine visit, or \"check-up,\" with your doctor. You might also hear it called a \"wellness visit\" or \"preventive visit.\"  During each visit, the doctor will:   Ask about your physical and mental health   Ask about your habits, behaviors, and lifestyle   Do an exam   Give you vaccines if needed   Talk to you about any medicines you take   Give advice about your health   Answer your questions  Getting regular check-ups is an important part of taking care of your health. It can help your doctor find and treat any problems you have. But it's also important for preventing health problems.  A routine physical is different from a \"sick visit.\" A sick visit is when you see a doctor because of a health concern or problem. Since physicals are scheduled ahead of time, you can think about what you want to ask the doctor.  How often should I get a physical? -- It depends on your age and health. In general, for people age 21 years and older:   If you are younger than 50 years, you might be able to get a physical every 3 years.   If you are 50 years or older, your doctor might recommend a physical every year.  If you have an ongoing health condition, like diabetes or high blood pressure, your doctor will probably want to see you more often.  What happens during a physical? -- In general, each visit will include:   Physical exam - The doctor or nurse will check your height, weight, heart rate, and blood pressure. They will also look at your eyes and ears. They will ask about how you are feeling and whether you have any symptoms that bother you.   Medicines - It's a good idea to bring a list of all the medicines you take to each doctor visit. Your doctor will talk to you about your medicines and answer any questions. Tell them if you are having any side effects that bother you. You " "should also tell them if you are having trouble paying for any of your medicines.   Habits and behaviors - This includes:   Your diet   Your exercise habits   Whether you smoke, drink alcohol, or use drugs   Whether you are sexually active   Whether you feel safe at home  Your doctor will talk to you about things you can do to improve your health and lower your risk of health problems. They will also offer help and support. For example, if you want to quit smoking, they can give you advice and might prescribe medicines. If you want to improve your diet or get more physical activity, they can help you with this, too.   Lab tests, if needed - The tests you get will depend on your age and situation. For example, your doctor might want to check your:   Cholesterol   Blood sugar   Iron level   Vaccines - The recommended vaccines will depend on your age, health, and what vaccines you already had. Vaccines are very important because they can prevent certain serious or deadly infections.   Discussion of screening - \"Screening\" means checking for diseases or other health problems before they cause symptoms. Your doctor can recommend screening based on your age, risk, and preferences. This might include tests to check for:   Cancer, such as breast, prostate, cervical, ovarian, colorectal, prostate, lung, or skin cancer   Sexually transmitted infections, such as chlamydia and gonorrhea   Mental health conditions like depression and anxiety  Your doctor will talk to you about the different types of screening tests. They can help you decide which screenings to have. They can also explain what the results might mean.   Answering questions - The physical is a good time to ask the doctor or nurse questions about your health. If needed, they can refer you to other doctors or specialists, too.  Adults older than 65 years often need other care, too. As you get older, your doctor will talk to you about:   How to prevent falling at " home   Hearing or vision tests   Memory testing   How to take your medicines safely   Making sure that you have the help and support you need at home  All topics are updated as new evidence becomes available and our peer review process is complete.  This topic retrieved from [employer] on: May 02, 2024.  Topic 878990 Version 1.0  Release: 32.4.3 - C32.122  © 2024 UpToDate, Inc. and/or its affiliates. All rights reserved.  Consumer Information Use and Disclaimer   Disclaimer: This generalized information is a limited summary of diagnosis, treatment, and/or medication information. It is not meant to be comprehensive and should be used as a tool to help the user understand and/or assess potential diagnostic and treatment options. It does NOT include all information about conditions, treatments, medications, side effects, or risks that may apply to a specific patient. It is not intended to be medical advice or a substitute for the medical advice, diagnosis, or treatment of a health care provider based on the health care provider's examination and assessment of a patient's specific and unique circumstances. Patients must speak with a health care provider for complete information about their health, medical questions, and treatment options, including any risks or benefits regarding use of medications. This information does not endorse any treatments or medications as safe, effective, or approved for treating a specific patient. UpToDate, Inc. and its affiliates disclaim any warranty or liability relating to this information or the use thereof.The use of this information is governed by the Terms of Use, available at https://www.woltersEnjectuwer.com/en/know/clinical-effectiveness-terms. 2024© UpToDate, Inc. and its affiliates and/or licensors. All rights reserved.  Copyright   © 2024 UpToDate, Inc. and/or its affiliates. All rights reserved.

## 2025-04-07 NOTE — ASSESSMENT & PLAN NOTE
Managed on Keppra by Neurology. Tolerating well, denies SI/HI or recent seizure activity. Last office visit >2 years ago. Keppra level ordered. Last seizure >5 years ago. Encouraged patient to schedule office visit with Neurology.  Orders:    Levetiracetam level; Future

## 2025-04-07 NOTE — PROGRESS NOTES
Adult Annual Physical  Name: Maksim Campuzano      : 2000      MRN: [id number]  Encounter Provider: KASIE Granger  Encounter Date: 2025   Encounter department: [facility]    :  Assessment & Plan  Annual physical exam  Routing screenings UTD. Declining immunizations at this time (tdap, HPV, meningococcal). Flu/COVID vaccines deferred for fall.        Seizures (HCC)  Managed on Keppra by Neurology. Tolerating well, denies SI/HI or recent seizure activity. Last office visit >2 years ago. Keppra level ordered. Last seizure >5 years ago. Encouraged patient to schedule office visit with Neurology.  Orders:    Levetiracetam level; Future    Screening for hyperlipidemia  Never done, will screen.   Orders:    Lipid Panel with Direct LDL reflex; Future    Screening for diabetes mellitus  Never done, will screen.   Orders:    Comprehensive metabolic panel; Future    Hemoglobin A1C; Future    Screening for deficiency anemia  In setting of seizures and asthma, will screen for underlying anemia.   Orders:    CBC and differential; Future    Nasal polyps  Seasonal allergies secondary to nasal polyps. Seen by ENT in past, recommended long-term use of Claritin. Refill provided.  Orders:    loratadine (CLARITIN) 10 mg tablet; Take 1 tablet (10 mg total) by mouth daily    Mild intermittent asthma without complication  Asthma managed with prn albuterol and daily Advair Diskus. Refill for prn inhaler provided.  Orders:    albuterol (PROVENTIL HFA,VENTOLIN HFA) 90 mcg/act inhaler; Inhale 2 puffs every 6 (six) hours as needed for wheezing              Preventive Screenings:  - Diabetes Screening: orders placed  - Cholesterol Screening: orders placed   - Hepatitis C screening: patient declines   - HIV screening: patient declines   - Colon cancer screening: screening not indicated   - Lung cancer screening: screening not indicated   - Prostate cancer screening: screening not indicated      Immunizations:  - Immunizations due: Influenza, Tdap, HPV (Gardasil 9) and MenQuadfi (MenACWY)  - The patient declines recommended vaccines currently despite my recommendations      Counseling/Anticipatory Guidance:  - Alcohol: discussed moderation in alcohol intake and recommendations for healthy alcohol use.   - Drug use: discussed harms of illicit drug use and how it can negatively impact mental/physical health.   - Tobacco use: discussed harms of tobacco use and management options for quitting.   - Dental health: discussed importance of regular tooth brushing, flossing, and dental visits.   - Sexual health: discussed sexually transmitted diseases, partner selection, use of condoms, avoidance of unintended pregnancy, and contraceptive alternatives.   - Diet: discussed recommendations for a healthy/well-balanced diet.   - Exercise: the importance of regular exercise/physical activity was discussed. Recommend exercise 3-5 times per week for at least 30 minutes.   - Injury prevention: discussed safety/seat belts, safety helmets, smoke detectors, carbon monoxide detectors, and smoking near bedding or upholstery.       Depression Screening and Follow-up Plan: Patient was screened for depression during today's encounter. They screened negative with a PHQ-2 score of 0.          History of Present Illness     Adult Annual Physical:  Patient presents for annual physical. 24 year old male presents for an annual wellness exam and to establish care. PMH significant for asthma, seizures, and nasal polyps. He was seeing neurology for seizure management - last known seizure was in 2018. He has not had a follow-up with Neurology in the office since 2020. He was seen in the past by ENT for nasal polyps who recommended long-term use of claritin. He has not followed up with them since 2020. His last PCP visit was in 2020. He overall feels well and denies major concerns. He does work as a  for [employer]. He is  "requesting refills for claritin and albuterol inhaler..     Diet and Physical Activity:  - Diet/Nutrition: no special diet.  - Exercise: no formal exercise.    Depression Screening:  - PHQ-2 Score: 0    General Health:  - Sleep: sleeps well.  - Hearing: normal hearing right ear.  - Vision: no vision problems.  - Dental: regular dental visits.     Health:  - History of STDs: no.   - Urinary symptoms: none.     Advanced Care Planning:  - Has an advanced directive?: no    - Has a durable medical POA?: no    - ACP document given to patient?: no      Review of Systems   Constitutional:  Negative for activity change, appetite change, chills, fatigue and fever.   HENT:  Negative for congestion, rhinorrhea and sore throat.    Eyes:  Negative for visual disturbance.   Respiratory:  Negative for cough, chest tightness and shortness of breath.    Cardiovascular:  Negative for chest pain and palpitations.   Gastrointestinal:  Negative for abdominal pain, constipation, diarrhea, nausea and vomiting.   Musculoskeletal:  Negative for arthralgias, back pain, myalgias and neck pain.   Skin:  Negative for color change and pallor.   Allergic/Immunologic: Negative for environmental allergies and food allergies.   Neurological:  Negative for dizziness, light-headedness and headaches.         Objective   /78   Pulse 93   Ht 5' 8\" (1.727 m)   Wt 68.9 kg (152 lb)   SpO2 97%   BMI 23.11 kg/m²     Physical Exam  Vitals and nursing note reviewed.   Constitutional:       General: He is awake. He is not in acute distress.     Appearance: Normal appearance. He is well-developed and normal weight.   HENT:      Head: Normocephalic and atraumatic.      Right Ear: Hearing, tympanic membrane, ear canal and external ear normal.      Left Ear: Hearing, tympanic membrane, ear canal and external ear normal.      Nose: No congestion or rhinorrhea.      Mouth/Throat:      Lips: Pink.      Mouth: Mucous membranes are moist.      Pharynx: " Oropharynx is clear. Uvula midline.   Eyes:      Conjunctiva/sclera: Conjunctivae normal.   Cardiovascular:      Rate and Rhythm: Normal rate and regular rhythm.      Pulses: Normal pulses.      Heart sounds: Normal heart sounds.   Pulmonary:      Effort: Pulmonary effort is normal.      Breath sounds: Normal breath sounds.   Abdominal:      General: Abdomen is flat. Bowel sounds are normal.      Palpations: Abdomen is soft.      Tenderness: There is no abdominal tenderness. There is no right CVA tenderness, left CVA tenderness or guarding.   Musculoskeletal:      Cervical back: Normal range of motion and neck supple.   Neurological:      General: No focal deficit present.      Mental Status: He is alert and oriented to person, place, and time.   Psychiatric:         Mood and Affect: Mood normal.         Behavior: Behavior normal. Behavior is cooperative.         Thought Content: Thought content normal.         Judgment: Judgment normal.

## 2025-04-09 ENCOUNTER — PATIENT MESSAGE (OUTPATIENT)
Dept: FAMILY MEDICINE CLINIC | Facility: CLINIC | Age: 25
End: 2025-04-09

## 2025-04-09 DIAGNOSIS — J45.40 ASTHMA IN ADULT, MODERATE PERSISTENT, UNCOMPLICATED: Primary | ICD-10-CM

## 2025-04-10 RX ORDER — FLUTICASONE PROPIONATE AND SALMETEROL 250; 50 UG/1; UG/1
1 POWDER RESPIRATORY (INHALATION) 2 TIMES DAILY
Qty: 60 BLISTER | Refills: 3 | Status: SHIPPED | OUTPATIENT
Start: 2025-04-10

## 2025-04-29 LAB — HBA1C MFR BLD HPLC: 5.1 %

## 2025-05-07 ENCOUNTER — TELEPHONE (OUTPATIENT)
Dept: FAMILY MEDICINE CLINIC | Facility: CLINIC | Age: 25
End: 2025-05-07

## 2025-06-09 ENCOUNTER — OFFICE VISIT (OUTPATIENT)
Age: 25
End: 2025-06-09
Payer: COMMERCIAL

## 2025-06-09 VITALS
SYSTOLIC BLOOD PRESSURE: 112 MMHG | HEIGHT: 68 IN | WEIGHT: 158 LBS | DIASTOLIC BLOOD PRESSURE: 72 MMHG | BODY MASS INDEX: 23.95 KG/M2

## 2025-06-09 DIAGNOSIS — R56.9 SEIZURES (HCC): ICD-10-CM

## 2025-06-09 PROCEDURE — 99205 OFFICE O/P NEW HI 60 MIN: CPT

## 2025-06-09 RX ORDER — LEVETIRACETAM 750 MG/1
1500 TABLET ORAL 2 TIMES DAILY
Qty: 360 TABLET | Refills: 5 | Status: SHIPPED | OUTPATIENT
Start: 2025-06-09

## 2025-06-09 NOTE — ASSESSMENT & PLAN NOTE
Orders:    Levetiracetam level; Future    MRI brain with and without contrast; Future    EEG Prolonged > 1 hour; Future    levETIRAcetam (KEPPRA) 750 mg tablet; Take 2 tablets (1,500 mg total) by mouth 2 (two) times a day    Patient is a 25 year old right handed male with PMH of asthma and seizures who presents for evaluation of seizures.    He takes keppra 1500mg BID. Denies any seizures in the last 5 years. Denies any jerking or starring episodes. He is compliant with keppra, denies any side effects.     He used to see Dr. Ferraro - last seen 7/2020. He had his first seizure 9/2017 - mother heard a scream in the morning and witnessed him have  GTC, foaming in the mouth. No tongue biting or incontinence. Had post ictal confusion and fatigue.     EEG 3/2020 is normal    Plan:  - Continue keppra 1500mg BID  - Ordered keppra levels  - Ordered routine EEG  - F/U in 4 months

## 2025-06-09 NOTE — PROGRESS NOTES
Name: Maksim Campuzano      : 2000      MRN: [id number]  Encounter Provider: Gaudencio Cote MD  Encounter Date: 2025   Encounter department: [facility]  :  Assessment & Plan  Seizures (HCC)    Orders:    Levetiracetam level; Future    MRI brain with and without contrast; Future    EEG Prolonged > 1 hour; Future    levETIRAcetam (KEPPRA) 750 mg tablet; Take 2 tablets (1,500 mg total) by mouth 2 (two) times a day    Patient is a 25 year old right handed male with PMH of asthma and seizures who presents for evaluation of seizures.    He takes keppra 1500mg BID. Denies any seizures in the last 5 years. Denies any jerking or starring episodes. He is compliant with keppra, denies any side effects.     He used to see Dr. Ferraro - last seen 2020. He had his first seizure 2017 - mother heard a scream in the morning and witnessed him have  GTC, foaming in the mouth. No tongue biting or incontinence. Had post ictal confusion and fatigue.     EEG 3/2020 is normal    Plan:  - Continue keppra 1500mg BID  - Ordered keppra levels  - Ordered routine EEG  - F/U in 4 months      History of Present Illness   HPI     Justen Schaeffer is a 25 year old right handed male with PMH of asthma and seizures who presents for evaluation of seizures.    He takes keppra 1500mg BID. Denies any seizures in the last 5 years. Denies any jerking or starring episodes. He is compliant with keppra, denies any side effects.     He used to see Dr. Ferraro - last seen 2020. He had his first seizure 2017 - mother heard a scream in the morning and witnessed him have  GTC, foaming in the mouth. No tongue biting or incontinence. Had post ictal confusion and fatigue. He had another seizure in , which also consisted of GTC activity out of sleep during which he bit his tongue.     EEG 3/2020 is normal      Event/Seizure semiology:  Event type 1: Generalized tonic clonic activity  - Frequency/Date of last event:  2020  - Warning/Aura: no  - Loss of awareness: yes   - Amnestic to the event: yes  - Semiology: Generalized tonic clonic activity  - Presence of post-ictal period: yes -  confusion and fatigue  - Sonorus breathing: no  - Incontinence: no  - Tongue biting: yes    Special Features  Age/Date of seizure onset: 17  Status epilepticus: no  Self Injury Seizures: no  Precipitating Factors:    Longest seizure free interval: 5 years    Epilepsy Risk Factors:  None    Current AEDs:  Keppra  Medication side effects: None  Medication adherence: Yes    Prior AEDs:  None    Prior Evaluation:  - routine EEG: yes - normal  - cEEG/EMU: no  - MRI brain: no  - PET: no  - fMRI: no  - Ictal SPECT: no  - Neuropsych evaluation:   - Other:     History Reviewed:   The following were reviewed and updated as appropriate: allergies, current medications, past family history, past medical history, past social history, past surgical history, and problem list     Psychiatric History:  None    Social History:   Driving: Yes  Lives Alone: No  Occupation: He works at [employer]    Review of Systems   Constitutional:  Negative for appetite change, fatigue and fever.   HENT: Negative.  Negative for hearing loss, tinnitus, trouble swallowing and voice change.    Eyes: Negative.  Negative for photophobia, pain and visual disturbance.   Respiratory: Negative.  Negative for shortness of breath.    Cardiovascular: Negative.  Negative for palpitations.   Gastrointestinal: Negative.  Negative for nausea and vomiting.   Endocrine: Negative.  Negative for cold intolerance.   Genitourinary: Negative.  Negative for dysuria, frequency and urgency.   Musculoskeletal:  Negative for back pain, gait problem, myalgias, neck pain and neck stiffness.   Skin: Negative.  Negative for rash.   Allergic/Immunologic: Negative.    Neurological:  Positive for seizures. Negative for dizziness, tremors, syncope, facial asymmetry, speech difficulty, weakness, light-headedness, numbness  "and headaches.   Hematological: Negative.  Does not bruise/bleed easily.   Psychiatric/Behavioral: Negative.  Negative for confusion, hallucinations and sleep disturbance.     I have personally reviewed the MA's review of systems and made changes as necessary.     Objective   /72 (BP Location: Left arm, Patient Position: Sitting, Cuff Size: Standard)   Ht 5' 8\" (1.727 m)   Wt 71.7 kg (158 lb)   BMI 24.02 kg/m²     Physical Exam  Neurological Exam  /72 (BP Location: Left arm, Patient Position: Sitting, Cuff Size: Standard)   Ht 5' 8\" (1.727 m)   Wt 71.7 kg (158 lb)   BMI 24.02 kg/m²      General Exam  General: well developed, no acute distress.  HEENT: mucous membranes moist, anicteric sclera.   Neck: supple, good ROM.      Neurological Exam  Mental Status: awake, alert, and fully oriented to person, place, time, and situation. Attention and memory intact. Fund of knowledge is appropriate for age and education.  There is no neglect.    Language: fluency, and comprehension normal.       Cranial Nerves: Pupils equal and reactive to light.  Visual fields full to confrontation. Extraocular motions intact with full versions, normal pursuits and saccades. Facial strength full and symmetric. Facial sensation intact in V1-V3. Hearing intact to voice. Tongue protrudes to midline. Palate elevates symmetrically. Speech clear without notable dysarthria. Shoulder shrug activation full and symmetric.    Motor: Normal bulk and tone. No pronator drift. Strength is 5/5 proximally and distally in all 4 extremities. No involuntary movements.    Sensory: Sensation intact to light touch distally in all extremities.    Coordination: Normal finger-to-nose. Normal rapid alternating movements.     Station and gait: Casual and tandem gait normal. Normal Romberg.    Reflexes: Reflexes 2+ throughout and symmetric. Both toes down going.    Administrative Statements   I have spent a total time of 60 minutes in caring for this " patient on the day of the visit/encounter including Diagnostic results, Risks and benefits of tx options, Patient and family education, Impressions, and Communicating with other healthcare professionals .

## 2025-07-29 ENCOUNTER — PATIENT MESSAGE (OUTPATIENT)
Dept: FAMILY MEDICINE CLINIC | Facility: CLINIC | Age: 25
End: 2025-07-29

## 2025-07-29 DIAGNOSIS — J33.9 NASAL POLYPS: ICD-10-CM

## 2025-07-29 DIAGNOSIS — J33.9 NASAL POLYPS: Primary | ICD-10-CM

## 2025-07-30 RX ORDER — LORATADINE 10 MG/1
10 TABLET ORAL DAILY
Qty: 90 TABLET | Refills: 1 | OUTPATIENT
Start: 2025-07-30 | End: 2025-10-28

## 2025-07-30 RX ORDER — LORATADINE 10 MG/1
10 TABLET ORAL DAILY
Qty: 90 TABLET | Refills: 1 | Status: SHIPPED | OUTPATIENT
Start: 2025-07-30